# Patient Record
Sex: FEMALE | Race: WHITE | NOT HISPANIC OR LATINO | Employment: STUDENT | URBAN - METROPOLITAN AREA
[De-identification: names, ages, dates, MRNs, and addresses within clinical notes are randomized per-mention and may not be internally consistent; named-entity substitution may affect disease eponyms.]

---

## 2023-03-02 LAB — URINE CULTURE: NORMAL

## 2023-04-12 LAB — URINE CULTURE: NORMAL

## 2023-04-27 LAB — GROUP B STREP SCREEN: ABNORMAL

## 2023-05-12 ENCOUNTER — APPOINTMENT (OUTPATIENT)
Dept: PRIMARY CARE | Facility: CLINIC | Age: 23
End: 2023-05-12
Payer: COMMERCIAL

## 2023-09-26 ENCOUNTER — APPOINTMENT (OUTPATIENT)
Dept: PRIMARY CARE | Facility: CLINIC | Age: 23
End: 2023-09-26
Payer: COMMERCIAL

## 2023-10-27 DIAGNOSIS — F41.9 ANXIETY: Primary | ICD-10-CM

## 2023-10-27 RX ORDER — FLUOXETINE 20 MG/1
20 TABLET ORAL DAILY
Qty: 30 TABLET | Refills: 3 | Status: SHIPPED | OUTPATIENT
Start: 2023-10-27 | End: 2024-01-30

## 2023-11-22 ENCOUNTER — PATIENT MESSAGE (OUTPATIENT)
Dept: OBSTETRICS AND GYNECOLOGY | Facility: CLINIC | Age: 23
End: 2023-11-22
Payer: COMMERCIAL

## 2023-11-22 DIAGNOSIS — F41.9 ANXIETY: Primary | ICD-10-CM

## 2023-11-28 PROBLEM — O24.414 INSULIN CONTROLLED GESTATIONAL DIABETES MELLITUS (GDM) DURING PREGNANCY, ANTEPARTUM (HHS-HCC): Status: RESOLVED | Noted: 2023-11-28 | Resolved: 2023-11-28

## 2023-11-28 PROBLEM — O10.011: Status: RESOLVED | Noted: 2023-11-28 | Resolved: 2023-11-28

## 2023-11-28 PROBLEM — O99.810 GLUCOSE INTOLERANCE OF PREGNANCY (HHS-HCC): Status: RESOLVED | Noted: 2023-11-28 | Resolved: 2023-11-28

## 2023-11-28 PROBLEM — L50.8 CHRONIC URTICARIA: Status: RESOLVED | Noted: 2023-11-28 | Resolved: 2023-11-28

## 2023-11-28 PROBLEM — L44.2 LICHEN STRIATUS: Status: RESOLVED | Noted: 2019-10-25 | Resolved: 2023-11-28

## 2023-11-28 PROBLEM — F41.9 ANXIETY AND DEPRESSION: Status: RESOLVED | Noted: 2023-11-28 | Resolved: 2023-11-28

## 2023-11-28 PROBLEM — K59.09 CHRONIC CONSTIPATION: Status: RESOLVED | Noted: 2023-11-28 | Resolved: 2023-11-28

## 2023-11-28 PROBLEM — F32.A ANXIETY AND DEPRESSION: Status: RESOLVED | Noted: 2023-11-28 | Resolved: 2023-11-28

## 2023-11-28 PROBLEM — E66.3 OVERWEIGHT WITH BODY MASS INDEX (BMI) 25.0-29.9: Status: ACTIVE | Noted: 2023-11-28

## 2023-11-28 PROBLEM — K21.9 GERD (GASTROESOPHAGEAL REFLUX DISEASE): Status: RESOLVED | Noted: 2023-11-28 | Resolved: 2023-11-28

## 2023-11-28 PROBLEM — U07.1 COVID-19: Status: RESOLVED | Noted: 2023-11-28 | Resolved: 2023-11-28

## 2023-11-28 PROBLEM — L70.0 ACNE VULGARIS: Status: RESOLVED | Noted: 2019-10-25 | Resolved: 2023-11-28

## 2023-11-28 PROBLEM — O24.419 GESTATIONAL DIABETES MELLITUS (GDM) IN THIRD TRIMESTER (HHS-HCC): Status: RESOLVED | Noted: 2023-11-28 | Resolved: 2023-11-28

## 2023-11-28 PROBLEM — R73.9 HYPERGLYCEMIA: Status: ACTIVE | Noted: 2023-11-28

## 2023-11-28 PROBLEM — J30.89 PERENNIAL ALLERGIC RHINITIS: Status: RESOLVED | Noted: 2023-11-28 | Resolved: 2023-11-28

## 2023-11-28 PROBLEM — G43.019 INTRACTABLE MIGRAINE WITHOUT AURA AND WITHOUT STATUS MIGRAINOSUS: Status: RESOLVED | Noted: 2023-11-28 | Resolved: 2023-11-28

## 2023-11-28 PROBLEM — E78.00 HYPERCHOLESTEROLEMIA: Status: ACTIVE | Noted: 2023-11-28

## 2023-11-28 PROBLEM — O13.9 GESTATIONAL HYPERTENSION (HHS-HCC): Status: RESOLVED | Noted: 2023-11-28 | Resolved: 2023-11-28

## 2023-11-28 RX ORDER — NORETHINDRONE 0.35 MG/1
1 TABLET ORAL DAILY
COMMUNITY
End: 2024-06-07 | Stop reason: SDUPTHER

## 2023-11-28 RX ORDER — FLUOXETINE HYDROCHLORIDE 40 MG/1
40 CAPSULE ORAL DAILY
Qty: 30 CAPSULE | Refills: 11 | Status: SHIPPED | OUTPATIENT
Start: 2023-11-28 | End: 2024-11-27

## 2024-01-18 ENCOUNTER — TELEPHONE (OUTPATIENT)
Dept: PRIMARY CARE | Facility: CLINIC | Age: 24
End: 2024-01-18
Payer: COMMERCIAL

## 2024-01-30 ENCOUNTER — OFFICE VISIT (OUTPATIENT)
Dept: PRIMARY CARE | Facility: CLINIC | Age: 24
End: 2024-01-30
Payer: COMMERCIAL

## 2024-01-30 VITALS
HEART RATE: 81 BPM | RESPIRATION RATE: 16 BRPM | TEMPERATURE: 97.6 F | DIASTOLIC BLOOD PRESSURE: 76 MMHG | HEIGHT: 60 IN | WEIGHT: 169.4 LBS | OXYGEN SATURATION: 98 % | BODY MASS INDEX: 33.26 KG/M2 | SYSTOLIC BLOOD PRESSURE: 116 MMHG

## 2024-01-30 DIAGNOSIS — Z00.00 WELL ADULT EXAM: Primary | ICD-10-CM

## 2024-01-30 DIAGNOSIS — Z78.9 NEVER SMOKED CIGARETTES: ICD-10-CM

## 2024-01-30 DIAGNOSIS — R73.9 HYPERGLYCEMIA: ICD-10-CM

## 2024-01-30 DIAGNOSIS — E66.9 CLASS 1 OBESITY WITH BODY MASS INDEX (BMI) OF 33.0 TO 33.9 IN ADULT, UNSPECIFIED OBESITY TYPE, UNSPECIFIED WHETHER SERIOUS COMORBIDITY PRESENT: ICD-10-CM

## 2024-01-30 LAB
ALBUMIN SERPL BCP-MCNC: 4.9 G/DL (ref 3.4–5)
ALP SERPL-CCNC: 97 U/L (ref 33–110)
ALT SERPL W P-5'-P-CCNC: 18 U/L (ref 7–45)
ANION GAP SERPL CALC-SCNC: 14 MMOL/L (ref 10–20)
AST SERPL W P-5'-P-CCNC: 15 U/L (ref 9–39)
BILIRUB SERPL-MCNC: 0.3 MG/DL (ref 0–1.2)
BUN SERPL-MCNC: 9 MG/DL (ref 6–23)
CALCIUM SERPL-MCNC: 9.4 MG/DL (ref 8.6–10.3)
CHLORIDE SERPL-SCNC: 104 MMOL/L (ref 98–107)
CHOLEST SERPL-MCNC: 226 MG/DL (ref 0–199)
CHOLESTEROL/HDL RATIO: 4.4
CO2 SERPL-SCNC: 26 MMOL/L (ref 21–32)
CREAT SERPL-MCNC: 0.61 MG/DL (ref 0.5–1.05)
EGFRCR SERPLBLD CKD-EPI 2021: >90 ML/MIN/1.73M*2
ERYTHROCYTE [DISTWIDTH] IN BLOOD BY AUTOMATED COUNT: 12.9 % (ref 11.5–14.5)
GLUCOSE SERPL-MCNC: 69 MG/DL (ref 74–99)
HCT VFR BLD AUTO: 41 % (ref 36–46)
HDLC SERPL-MCNC: 51.6 MG/DL
HGB BLD-MCNC: 13 G/DL (ref 12–16)
LDLC SERPL CALC-MCNC: 146 MG/DL
MCH RBC QN AUTO: 26.4 PG (ref 26–34)
MCHC RBC AUTO-ENTMCNC: 31.7 G/DL (ref 32–36)
MCV RBC AUTO: 83 FL (ref 80–100)
NON HDL CHOLESTEROL: 174 MG/DL (ref 0–149)
NRBC BLD-RTO: 0 /100 WBCS (ref 0–0)
PLATELET # BLD AUTO: 370 X10*3/UL (ref 150–450)
POTASSIUM SERPL-SCNC: 4.4 MMOL/L (ref 3.5–5.3)
PROT SERPL-MCNC: 7.2 G/DL (ref 6.4–8.2)
RBC # BLD AUTO: 4.92 X10*6/UL (ref 4–5.2)
SODIUM SERPL-SCNC: 140 MMOL/L (ref 136–145)
TRIGL SERPL-MCNC: 142 MG/DL (ref 0–149)
VLDL: 28 MG/DL (ref 0–40)
WBC # BLD AUTO: 8.6 X10*3/UL (ref 4.4–11.3)

## 2024-01-30 PROCEDURE — 80061 LIPID PANEL: CPT

## 2024-01-30 PROCEDURE — 1036F TOBACCO NON-USER: CPT | Performed by: FAMILY MEDICINE

## 2024-01-30 PROCEDURE — 83036 HEMOGLOBIN GLYCOSYLATED A1C: CPT

## 2024-01-30 PROCEDURE — 85027 COMPLETE CBC AUTOMATED: CPT

## 2024-01-30 PROCEDURE — 36415 COLL VENOUS BLD VENIPUNCTURE: CPT

## 2024-01-30 PROCEDURE — 80053 COMPREHEN METABOLIC PANEL: CPT

## 2024-01-30 PROCEDURE — 3008F BODY MASS INDEX DOCD: CPT | Performed by: FAMILY MEDICINE

## 2024-01-30 PROCEDURE — 99395 PREV VISIT EST AGE 18-39: CPT | Performed by: FAMILY MEDICINE

## 2024-01-30 ASSESSMENT — ENCOUNTER SYMPTOMS
ABDOMINAL PAIN: 0
HEADACHES: 0
LOSS OF SENSATION IN FEET: 0
EYE PAIN: 0
RHINORRHEA: 0
HALLUCINATIONS: 0
BLOOD IN STOOL: 0
FEVER: 0
COUGH: 0
OCCASIONAL FEELINGS OF UNSTEADINESS: 0
WOUND: 0
DYSURIA: 0
PALPITATIONS: 0
HEMATURIA: 0
CHILLS: 0
EYE REDNESS: 0
SHORTNESS OF BREATH: 0
WEAKNESS: 0
SORE THROAT: 0
BACK PAIN: 0
DEPRESSION: 0
POLYDIPSIA: 0
ADENOPATHY: 0
BRUISES/BLEEDS EASILY: 0
NECK STIFFNESS: 0
FATIGUE: 0

## 2024-01-30 ASSESSMENT — PATIENT HEALTH QUESTIONNAIRE - PHQ9
2. FEELING DOWN, DEPRESSED OR HOPELESS: NOT AT ALL
1. LITTLE INTEREST OR PLEASURE IN DOING THINGS: NOT AT ALL
SUM OF ALL RESPONSES TO PHQ9 QUESTIONS 1 AND 2: 0

## 2024-01-30 NOTE — PROGRESS NOTES
"Emperatriz Duvall \"Leticia\" is a 23 y.o. female with Chief Complaint of Annual Exam (CPE).    OCP Micronor while breastfeeding.   Moods stable on fluoxetine 40 mg.     Changed position working from home which is working well.   Exhausted from up all night.     History reviewed. No pertinent surgical history.   Social History     Socioeconomic History    Marital status: Significant Other     Spouse name: Not on file    Number of children: Not on file    Years of education: Not on file    Highest education level: Not on file   Occupational History    Not on file   Tobacco Use    Smoking status: Never     Passive exposure: Never    Smokeless tobacco: Never   Vaping Use    Vaping Use: Never used   Substance and Sexual Activity    Alcohol use: Not Currently    Drug use: Never    Sexual activity: Yes     Partners: Male     Birth control/protection: OCP   Other Topics Concern    Not on file   Social History Narrative    Not on file     Social Determinants of Health     Financial Resource Strain: Not on file   Food Insecurity: Not on file   Transportation Needs: Not on file   Physical Activity: Not on file   Stress: Not on file   Social Connections: Not on file   Intimate Partner Violence: Not on file   Housing Stability: Not on file     Past Medical History:   Diagnosis Date    Acne vulgaris 10/25/2019    Acute pharyngitis, unspecified 03/05/2015    Sore throat    Adverse effect of other vaccines and biological substances, sequela 08/10/2018    Adverse effect of vaccine, sequela    Allergic contact dermatitis due to plants, except food 06/29/2016    Contact dermatitis due to poison ivy    Chronic constipation 11/28/2023    Chronic ethmoidal sinusitis 12/18/2017    Chronic posterior ethmoidal sinusitis    COVID-19 11/28/2023    GERD (gastroesophageal reflux disease) 11/28/2023    Gestational diabetes mellitus (GDM) in third trimester 11/28/2023    Gestational hypertension 11/28/2023    Glucose intolerance of pregnancy " 11/28/2023    Insulin controlled gestational diabetes mellitus (GDM) during pregnancy, antepartum 11/28/2023    Intractable migraine without aura and without status migrainosus 11/28/2023    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    Other abnormal glucose 08/07/2018    Elevated glucose level    Perennial allergic rhinitis 11/28/2023    Personal history of diseases of the skin and subcutaneous tissue 12/06/2017    History of dermatitis    Personal history of other diseases of the digestive system 08/04/2017    History of constipation    Personal history of other diseases of the nervous system and sense organs 08/25/2015    History of episcleritis    Personal history of other diseases of the respiratory system 02/02/2015    History of acute sinusitis    Personal history of other diseases of the respiratory system 04/30/2015    History of acute bronchitis    Personal history of other specified conditions 08/09/2018    History of nausea    Personal history of other specified conditions 08/04/2017    History of abdominal pain    Personal history of other specified conditions 09/05/2017    History of right flank pain    Personal history of other specified conditions 08/16/2017    History of headache    Pre-existing essential htn comp pregnancy, first trimester 11/28/2023      No family history on file.     Review of Systems   Constitutional:  Negative for chills, fatigue and fever.   HENT:  Negative for rhinorrhea and sore throat.    Eyes:  Negative for pain and redness.   Respiratory:  Negative for cough and shortness of breath.    Cardiovascular:  Negative for chest pain and palpitations.   Gastrointestinal:  Negative for abdominal pain and blood in stool.   Endocrine: Negative for polydipsia and polyuria.   Genitourinary:  Negative for dysuria and hematuria.   Musculoskeletal:  Negative for back pain and neck stiffness.   Skin:  Negative for rash and wound.   Allergic/Immunologic: Negative for environmental allergies and food  allergies.   Neurological:  Negative for weakness and headaches.   Hematological:  Negative for adenopathy. Does not bruise/bleed easily.   Psychiatric/Behavioral:  Negative for hallucinations and suicidal ideas.       /76 (BP Location: Left arm, Patient Position: Sitting, BP Cuff Size: Adult)   Pulse 81   Temp 36.4 °C (97.6 °F) (Temporal)   Resp 16   Ht 1.524 m (5')   Wt 76.8 kg (169 lb 6.4 oz)   SpO2 98%   BMI 33.08 kg/m²   Physical Exam  Vitals reviewed.   Constitutional:       General: She is not in acute distress.     Appearance: She is not ill-appearing.   HENT:      Head: Normocephalic and atraumatic.      Right Ear: Tympanic membrane normal.      Left Ear: Tympanic membrane normal.      Nose: No congestion or rhinorrhea.      Mouth/Throat:      Pharynx: No oropharyngeal exudate or posterior oropharyngeal erythema.   Eyes:      Extraocular Movements: Extraocular movements intact.      Conjunctiva/sclera: Conjunctivae normal.      Pupils: Pupils are equal, round, and reactive to light.   Cardiovascular:      Rate and Rhythm: Normal rate and regular rhythm.      Heart sounds: No murmur heard.     No friction rub. No gallop.   Pulmonary:      Effort: Pulmonary effort is normal.      Breath sounds: Normal breath sounds. No wheezing, rhonchi or rales.   Abdominal:      General: There is no distension.      Palpations: Abdomen is soft.      Tenderness: There is no abdominal tenderness. There is no guarding or rebound.   Musculoskeletal:         General: No swelling or deformity.      Cervical back: Normal range of motion and neck supple.      Right lower leg: No edema.      Left lower leg: No edema.   Skin:     Capillary Refill: Capillary refill takes less than 2 seconds.      Coloration: Skin is not jaundiced.      Findings: No rash.   Neurological:      General: No focal deficit present.      Mental Status: She is alert.      Motor: No weakness.   Psychiatric:         Mood and Affect: Mood normal.     "     Behavior: Behavior normal.       Lab Results   Component Value Date    WBC 8.6 05/09/2023    HGB 10.7 (L) 05/09/2023    HCT 33.1 (L) 05/09/2023    MCV 85 05/09/2023     05/09/2023     Lab Results   Component Value Date    CHOL 263 (H) 09/28/2021    CHOL 247 (H) 09/28/2020     Lab Results   Component Value Date    HDL 54.3 09/28/2021    HDL 57.8 09/28/2020     No results found for: \"LDLCALC\"  Lab Results   Component Value Date    TRIG 105 09/28/2021    TRIG 98 09/28/2020     No components found for: \"CHOLHDL\"  Lab Results   Component Value Date    HGBA1C 5.1 09/28/2020       Assessment/Plan   Problem List Items Addressed This Visit       Hyperglycemia    Overview     Hx of gestational diabetes.          Current Assessment & Plan     Monitor A1c.          [facility] adult exam - Primary    Overview     [facility] exam performed 1/30/24 (Abebe)         Current Assessment & Plan     1/30/24 Annual preventative exam:  continue OCP Micronor.  Mammograms at age 39.  Colonoscopy at age 44.   Exercise and healthy diet.            Post partum depression    Overview     Controlled with fluoxetine 40 mg daily per Dr Berg.           Other Visit Diagnoses       Class 1 obesity with body mass index (BMI) of 33.0 to 33.9 in adult, unspecified obesity type, unspecified whether serious comorbidity present        Never smoked cigarettes                "

## 2024-01-30 NOTE — ASSESSMENT & PLAN NOTE
1/30/24 Annual preventative exam:  continue OCP Micronor.  Mammograms at age 39.  Colonoscopy at age 44.   Exercise and healthy diet.

## 2024-01-31 PROBLEM — R73.03 PRE-DIABETES: Status: ACTIVE | Noted: 2023-11-28

## 2024-01-31 LAB
EST. AVERAGE GLUCOSE BLD GHB EST-MCNC: 117 MG/DL
HBA1C MFR BLD: 5.7 %

## 2024-02-16 ENCOUNTER — APPOINTMENT (OUTPATIENT)
Dept: PRIMARY CARE | Facility: CLINIC | Age: 24
End: 2024-02-16
Payer: COMMERCIAL

## 2024-04-17 ENCOUNTER — PATIENT MESSAGE (OUTPATIENT)
Dept: PRIMARY CARE | Facility: CLINIC | Age: 24
End: 2024-04-17
Payer: COMMERCIAL

## 2024-04-17 DIAGNOSIS — R10.2 PELVIC PAIN AFFECTING PREGNANCY, ANTEPARTUM (HHS-HCC): ICD-10-CM

## 2024-04-17 DIAGNOSIS — M62.08 DIASTASIS RECTI: ICD-10-CM

## 2024-04-17 DIAGNOSIS — R63.5 WEIGHT GAIN: ICD-10-CM

## 2024-04-17 DIAGNOSIS — O26.899 PELVIC PAIN AFFECTING PREGNANCY, ANTEPARTUM (HHS-HCC): ICD-10-CM

## 2024-04-27 ENCOUNTER — LAB (OUTPATIENT)
Dept: LAB | Facility: LAB | Age: 24
End: 2024-04-27
Payer: COMMERCIAL

## 2024-04-27 DIAGNOSIS — R63.5 WEIGHT GAIN: ICD-10-CM

## 2024-04-27 LAB — TSH SERPL-ACNC: 1.61 MIU/L (ref 0.44–3.98)

## 2024-04-27 PROCEDURE — 36415 COLL VENOUS BLD VENIPUNCTURE: CPT

## 2024-04-27 PROCEDURE — 84443 ASSAY THYROID STIM HORMONE: CPT

## 2024-04-30 ENCOUNTER — LAB REQUISITION (OUTPATIENT)
Dept: LAB | Facility: HOSPITAL | Age: 24
End: 2024-04-30
Payer: COMMERCIAL

## 2024-04-30 DIAGNOSIS — J02.9 ACUTE PHARYNGITIS, UNSPECIFIED: ICD-10-CM

## 2024-04-30 LAB — S PYO DNA THROAT QL NAA+PROBE: NOT DETECTED

## 2024-04-30 PROCEDURE — 87651 STREP A DNA AMP PROBE: CPT

## 2024-06-07 DIAGNOSIS — Z30.41 ENCOUNTER FOR SURVEILLANCE OF CONTRACEPTIVE PILLS: Primary | ICD-10-CM

## 2024-06-07 RX ORDER — NORETHINDRONE 0.35 MG/1
1 TABLET ORAL DAILY
Qty: 84 TABLET | Refills: 0 | Status: SHIPPED | OUTPATIENT
Start: 2024-06-07

## 2024-06-13 ENCOUNTER — APPOINTMENT (OUTPATIENT)
Dept: UROLOGY | Facility: CLINIC | Age: 24
End: 2024-06-13
Payer: COMMERCIAL

## 2024-06-13 DIAGNOSIS — O26.899 PELVIC PAIN AFFECTING PREGNANCY, ANTEPARTUM (HHS-HCC): ICD-10-CM

## 2024-06-13 DIAGNOSIS — R10.2 PELVIC PAIN AFFECTING PREGNANCY, ANTEPARTUM (HHS-HCC): ICD-10-CM

## 2024-06-13 DIAGNOSIS — M62.08 DIASTASIS RECTI: ICD-10-CM

## 2024-06-13 LAB
POC APPEARANCE, URINE: CLEAR
POC BILIRUBIN, URINE: NEGATIVE
POC BLOOD, URINE: NEGATIVE
POC COLOR, URINE: YELLOW
POC GLUCOSE, URINE: NEGATIVE MG/DL
POC KETONES, URINE: NEGATIVE MG/DL
POC LEUKOCYTES, URINE: NEGATIVE
POC NITRITE,URINE: NEGATIVE
POC PH, URINE: 7 PH
POC PROTEIN, URINE: NEGATIVE MG/DL
POC SPECIFIC GRAVITY, URINE: 1.02
POC UROBILINOGEN, URINE: 0.2 EU/DL

## 2024-06-13 PROCEDURE — G2211 COMPLEX E/M VISIT ADD ON: HCPCS | Performed by: STUDENT IN AN ORGANIZED HEALTH CARE EDUCATION/TRAINING PROGRAM

## 2024-06-13 PROCEDURE — 51798 US URINE CAPACITY MEASURE: CPT | Performed by: STUDENT IN AN ORGANIZED HEALTH CARE EDUCATION/TRAINING PROGRAM

## 2024-06-13 PROCEDURE — 3008F BODY MASS INDEX DOCD: CPT | Performed by: STUDENT IN AN ORGANIZED HEALTH CARE EDUCATION/TRAINING PROGRAM

## 2024-06-13 PROCEDURE — 99204 OFFICE O/P NEW MOD 45 MIN: CPT | Performed by: STUDENT IN AN ORGANIZED HEALTH CARE EDUCATION/TRAINING PROGRAM

## 2024-06-13 NOTE — LETTER
June 17, 2024     Jt Lomas MD  [address]  [address]  [facility] [address]    Patient: Leticia Duvall   YOB: 2000   Date of Visit: 6/13/2024       Dear Dr. Jt Lomas MD:    Thank you for referring Leticia Duvall to me for evaluation. Below are my notes for this consultation.  If you have questions, please do not hesitate to call me. I look forward to following your patient along with you.       Sincerely,     Alfonzo Montez MD      CC: No Recipients  ______________________________________________________________________________________    HISTORY OF PRESENT ILLNESS:  Epmeratriz Duvall is a 24 y.o. female who presents today as a new patient for pelvic pain.  She is status post a vaginal birth complicated by third-degree laceration that was repaired.  Since then she is reporting pelvic pain and stress urinary.  Pain is worse during intercourse.  But also she complains of some lower abdominal pain.  She is still planning of having more children. She states her bowel movements are normal. She states she feels like she is healing well after her birthing. She does have pain with intercourse.              Past Medical History  She has a past medical history of Acne vulgaris (10/25/2019), Acute pharyngitis, unspecified (03/05/2015), Adverse effect of other vaccines and biological substances, sequela (08/10/2018), Allergic contact dermatitis due to plants, except food (06/29/2016), Chronic constipation (11/28/2023), Chronic ethmoidal sinusitis (12/18/2017), COVID-19 (11/28/2023), GERD (gastroesophageal reflux disease) (11/28/2023), Gestational diabetes mellitus (GDM) in third trimester ([facility]) (11/28/2023), Gestational hypertension ([facility]) (11/28/2023), Glucose intolerance of pregnancy ([facility]) (11/28/2023), Insulin controlled gestational diabetes mellitus (GDM) during pregnancy, antepartum ([facility]) (11/28/2023), Intractable migraine without aura and without status  migrainosus (11/28/2023), 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), Other abnormal glucose (08/07/2018), Perennial allergic rhinitis (11/28/2023), Personal history of diseases of the skin and subcutaneous tissue (12/06/2017), Personal history of other diseases of the digestive system (08/04/2017), Personal history of other diseases of the nervous system and sense organs (08/25/2015), Personal history of other diseases of the respiratory system (02/02/2015), Personal history of other diseases of the respiratory system (04/30/2015), Personal history of other specified conditions (08/09/2018), Personal history of other specified conditions (08/04/2017), Personal history of other specified conditions (09/05/2017), Personal history of other specified conditions (08/16/2017), and Pre-existing essential htn comp pregnancy, first trimester ([facility]) (11/28/2023).    Surgical History  She has no past surgical history on file.     Social History  She reports that she has never smoked. She has never been exposed to tobacco smoke. She has never used smokeless tobacco. She reports that she does not currently use alcohol. She reports that she does not use drugs.    Family History  No family history on file.     Allergies  Azithromycin and Cephalexin        A comprehensive 10+ review of systems was negative except for: see hpi                    PHYSICAL EXAMINATION:  BP Readings from Last 3 Encounters:   01/30/24 116/76   06/29/23 110/72   05/17/23 132/80      Wt Readings from Last 3 Encounters:   01/30/24 76.8 kg (169 lb 6.4 oz)   06/29/23 70.8 kg (156 lb)   05/17/23 71.2 kg (157 lb)      BMI: Estimated body mass index is 33.08 kg/m² as calculated from the following:    Height as of 1/30/24: 1.524 m (5').    Weight as of 1/30/24: 76.8 kg (169 lb 6.4 oz).  BSA: Estimated body surface area is 1.8 meters squared as calculated from the following:    Height as of 1/30/24: 1.524 m (5').    Weight as of 1/30/24: 76.8 kg (169 lb 6.4 oz).  HEENT:  Normocephalic, atraumatic, PER EOMI, nonicteric, trachea normal, thyroid normal, oropharynx normal.  CARDIAC: regular rate & rhythm, S1 & S2 normal.  No heaves, thrills, gallops or murmurs.  LUNGS: Clear to auscultation, no spinal or CV tenderness.  EXTREMITIES: No evidence of cyanosis, clubbing or edema.      Pelvic:  Genitourinary:  normal external genitalia, Bartholin's glands negative, [place]'s glands negative  Urethra   normal meatus, non-tender, no periurethral mass  Vaginal mucosa  normal  Cervix normal  Uterus  normal size, nontender  Adnexae  negative nontender, no masses  Atrophy positive    CST positive  Pelvic floor muscle contraction  4/5    POP-Q (in supine position):        Aa -3     Ba -3     C -8              gh 3     pb 3     tvl 8              Ap -3     Bp -3     D -8    Rectal: no hemorrhoids, fissures or masses    Pain 8-10/10 pain with palpation     PVR 1 mL     IMPRESSION AND PLAN:        Assessment:  23 yo female who presents as a new patient with pelvic pain     Pelvic pain:  Pelvic floor physical therapy referral   Rx vaginal valium       Follow up in 3 months     All questions and concerns were answered and addressed.  The patient expressed understanding and agrees with the plan.     Alfonzo Montez MD    Scribe Attestation  By signing my name below, IRoslyn, [place]rib[place]   attest that this documentation has been prepared under the direction and in the presence of Alfonzo Montez MD.

## 2024-06-13 NOTE — PROGRESS NOTES
HISTORY OF PRESENT ILLNESS:  Emperatriz Duvall is a 24 y.o. female who presents today as a new patient for pelvic pain.  She is status post a vaginal birth complicated by third-degree laceration that was repaired.  Since then she is reporting pelvic pain and stress urinary.  Pain is worse during intercourse.  But also she complains of some lower abdominal pain.  She is still planning of having more children. She states her bowel movements are normal. She states she feels like she is healing well after her birthing. She does have pain with intercourse.              Past Medical History  She has a past medical history of Acne vulgaris (10/25/2019), Acute pharyngitis, unspecified (03/05/2015), Adverse effect of other vaccines and biological substances, sequela (08/10/2018), Allergic contact dermatitis due to plants, except food (06/29/2016), Chronic constipation (11/28/2023), Chronic ethmoidal sinusitis (12/18/2017), COVID-19 (11/28/2023), GERD (gastroesophageal reflux disease) (11/28/2023), Gestational diabetes mellitus (GDM) in third trimester ([facility]) (11/28/2023), Gestational hypertension ([facility]) (11/28/2023), Glucose intolerance of pregnancy ([facility]) (11/28/2023), Insulin controlled gestational diabetes mellitus (GDM) during pregnancy, antepartum ([facility]) (11/28/2023), Intractable migraine without aura and without status migrainosus (11/28/2023), 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), Other abnormal glucose (08/07/2018), Perennial allergic rhinitis (11/28/2023), Personal history of diseases of the skin and subcutaneous tissue (12/06/2017), Personal history of other diseases of the digestive system (08/04/2017), Personal history of other diseases of the nervous system and sense organs (08/25/2015), Personal history of other diseases of the respiratory system (02/02/2015), Personal history of other diseases of the respiratory system (04/30/2015), Personal history of other specified conditions (08/09/2018), Personal  history of other specified conditions (08/04/2017), Personal history of other specified conditions (09/05/2017), Personal history of other specified conditions (08/16/2017), and Pre-existing essential htn comp pregnancy, first trimester ([facility]) (11/28/2023).    Surgical History  She has no past surgical history on file.     Social History  She reports that she has never smoked. She has never been exposed to tobacco smoke. She has never used smokeless tobacco. She reports that she does not currently use alcohol. She reports that she does not use drugs.    Family History  No family history on file.     Allergies  Azithromycin and Cephalexin        A comprehensive 10+ review of systems was negative except for: see hpi                    PHYSICAL EXAMINATION:  BP Readings from Last 3 Encounters:   01/30/24 116/76   06/29/23 110/72   05/17/23 132/80      Wt Readings from Last 3 Encounters:   01/30/24 76.8 kg (169 lb 6.4 oz)   06/29/23 70.8 kg (156 lb)   05/17/23 71.2 kg (157 lb)      BMI: Estimated body mass index is 33.08 kg/m² as calculated from the following:    Height as of 1/30/24: 1.524 m (5').    Weight as of 1/30/24: 76.8 kg (169 lb 6.4 oz).  BSA: Estimated body surface area is 1.8 meters squared as calculated from the following:    Height as of 1/30/24: 1.524 m (5').    Weight as of 1/30/24: 76.8 kg (169 lb 6.4 oz).  HEENT: Normocephalic, atraumatic, PER EOMI, nonicteric, trachea normal, thyroid normal, oropharynx normal.  CARDIAC: regular rate & rhythm, S1 & S2 normal.  No heaves, thrills, gallops or murmurs.  LUNGS: Clear to auscultation, no spinal or CV tenderness.  EXTREMITIES: No evidence of cyanosis, clubbing or edema.      Pelvic:  Genitourinary:  normal external genitalia, Bartholin's glands negative, [place]'s glands negative  Urethra   normal meatus, non-tender, no periurethral mass  Vaginal mucosa  normal  Cervix normal  Uterus  normal size, nontender  Adnexae  negative nontender, no masses  Atrophy  positive    CST positive  Pelvic floor muscle contraction  4/5    POP-Q (in supine position):        Aa -3     Ba -3     C -8              gh 3     pb 3     tvl 8              Ap -3     Bp -3     D -8    Rectal: no hemorrhoids, fissures or masses    Pain 8-10/10 pain with palpation     PVR 1 mL     IMPRESSION AND PLAN:        Assessment:  25 yo female who presents as a new patient with pelvic pain     Pelvic pain:  Pelvic floor physical therapy referral   Rx vaginal valium       Follow up in 3 months     All questions and concerns were answered and addressed.  The patient expressed understanding and agrees with the plan.     Alfonzo Montez MD    Scribe Attestation  By signing my name below, I, Roslyn Banerjee, [place]   attest that this documentation has been prepared under the direction and in the presence of Alfonzo Montez MD.

## 2024-07-17 ENCOUNTER — EVALUATION (OUTPATIENT)
Dept: PHYSICAL THERAPY | Facility: CLINIC | Age: 24
End: 2024-07-17
Payer: COMMERCIAL

## 2024-07-17 DIAGNOSIS — O26.899 PELVIC PAIN AFFECTING PREGNANCY, ANTEPARTUM (HHS-HCC): ICD-10-CM

## 2024-07-17 DIAGNOSIS — R10.2 PELVIC PAIN AFFECTING PREGNANCY, ANTEPARTUM (HHS-HCC): ICD-10-CM

## 2024-07-17 PROCEDURE — 97162 PT EVAL MOD COMPLEX 30 MIN: CPT | Mod: GP

## 2024-07-17 NOTE — PROGRESS NOTES
"Physical Therapy Pelvic Floor  EVALUATION AND TREATMENT    Name: Emperatriz Duvall \"Leticia\"  MRN: [id number]  : 2000  Today's Date: 24     Time Calculation  Start Time: 1105  Stop Time: 1205  Time Calculation (min): 60 min     Time Calculation  Start Time: 1105  Stop Time: 1205  Time Calculation (min): 60 min  PT Evaluation Time Entry  PT Evaluation (Moderate) Time Entry: 30  PT Therapeutic Procedures Time Entry  Therapeutic Exercise Time Entry: 15  Therapeutic Activity Time Entry: 15    Insurance:  Insurance Type: ANTH SHARRI  Visit number: Evaluation   Approved # of visits: EVAL ONLY  Authorization Needed: yes through [employer]  Coverage: 20V - 0 used    Current Problem:  1. Pelvic pain affecting pregnancy, antepartum ([facility])  Referral to Physical Therapy          Subjective     Precautions  PMH: recent pregnancy   Fall Risk: no    Pelvic History:   Chief Complaint/Description of Symptoms:   Pt states has DR - doming in belly, intercourse in painful, cough/sneeze leakage  Urge once starting working out with pilates    May 10th 2023 - Tobias     Exercises: 360 breathing, heel slides, bridge, and happy baby    Not taking valium. Currently breastfeeding    HPI: per medical chart (urology)  24 y.o. female who presents today as a new patient for pelvic pain.  She is status post a vaginal birth complicated by third-degree laceration that was repaired.  Since then she is reporting pelvic pain and stress urinary.  Pain is worse during intercourse.  But also she complains of some lower abdominal pain.  She is still planning of having more children. She states her bowel movements are normal. She states she feels like she is healing well after her birthing. She does have pain with intercourse.      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)    Goals for therapy: \"heal abdominal muscles\"    Objective     Pain - no pain currently       PELVIC PAIN:  Pain " when emptying bladder: No  Pain when urine or clothing touches the skin over the vaginal area or wiping: No  Pain with menses: No (increase in cramping)  Pain with intercourse: Yes, deep, after, superficial, vaginal (improving; spotting)  Instructed and performing Kegel exercises: No  History of back pain: yes, thoracic - increase in mid/back pain (seeing plastic surgeon for breast reduction)  Increase in back pain with pregnancy; R LE sciatica     EXERCISE:  Do you perform Kegels?   Current exercise regime: walk every day about 1.5, Pilate's 1-2x a week    MENSTRUAL HISTORY:  Last menstrual period 24    OB HISTORY:   - vaginal delivery with 3rd degree tear      BLADDER:  Excessive urinary urgency: occasionally  Daytime voiding frequency : 4-5  Nighttime voiding frequency: 0, 1 time  Unintentional urine loss: less than 1x/week  Leakage occurs with: sneeze, cough  Leakage amount: small  Protection: none  Daily fluid intake: 40-50oz  Daily caffeine intake: cup of coffee in the morning  Difficulty initiating urine flow: frequently  Slow/Weak urine stream: frequently  Able to void completely: No  Frequent UTI's: No     BOWEL:  Unintentional stool loss: never  Bowel movement frequency: once a day  Frequent diarrhea: No  Constipation/straining,incomplete bowel movement: No  Taking stool softeners or fiber supplements: No     OBJECTIVE  External and internal assessment explained. Verbal consent obtained to proceed with vaginal exam.    R SLS 8 sec  L SLS 10 sec     Pt demo proper mechanics with squat    Lumbar ROM:   Lumbar flex: WFL     Lumbar ext:  25% imp with discomfort  Lumbar R/L side bend: 25% imp with tightness on left side     MMT   R  L  Hip    R   L    Flexion  4-/5  4/5  IR  3+/5  3+/5 in seated  ER  3+/5  3+/5 in seated  ABD  4-/5  4-/5  ADD  4-/5  4-/5  Ext  4-/5  4-/5  Glute  4-/5  4-/5    Knee   R   L    Ext   4-/5  4-/5  Flexion   4-/5  4-/5    ROM Hip  WLF    Tests  R  L   FADIR  -  -  LORNE  -  -  90/90  -  -  SLR  -  -  Slump  -  -  SI Compression +  +  SI Distraction +  +  Sacral Thrust +  +    External Palpation:  L/R lumbar hypomobility: thoracolumbar hypomobility with mod TTP   Iliopsoas: TTP   Adductor: TTP   Glutes: TTP   Abdominals: TTP      Hamstring dominant    Oblique dominant strategy    PELVIC FLOOR  Patient Position: hookly    Vaginal Observation:  Lift - glute activation  Bear down - min movement  Cough - no movement    Vaginal palpation external:   Pt with min TTP to external introitus     Vaginal palpation internal: sig TTP  4 o'clock (pubococcygeus)   5 o'clock (iliococcygeus)   6 o'clock (coccyx)    7 o'clock (iliococcygeus)   8 o'clock (pubococcygeus)   12 o'clock (pubic symphysis inferior angle)     Pelvic floor:  Strength: 3/5 (initial contraction 0/5 with glute activation; verbal/tactile cues pt was able to complete)  Coordination: 4 in 10 seconds  Endurance: 4 second hold x 5 reps  Tone - significant hypertonicity throughout superficial and deep PF muscles  Pain - significant pain to gentle palpation   R obterator    Outcome Measure:  NIH-CPSI: 15 points    Treatment: EVAL ONLY   Therapeutic Exercise:  Reviewed and completed HEP     Therapeutic Activity:  Extensive education using 3d model of pelvis to educate patient regarding pelvic floor musculature purpose and structure relating to functional activity, bathroom mechanics, breathing techniques, and correlation with back/hip strength and function    Manual:  Internal assessment     Assessment:      24 year old patient presenting to pelvic floor physical therapy with concern of stress incontinence, pelvic pain, and pain with intercourse that began after birth of daughter a year ago. Key impairments include: LE weakness, poor diaphragm movement/core engagement, impaired balance, ROM deficits, postural deficits, pain, and impaired functional movement. Pelvic floor demo significant  hypertonicity with significant tenderness to palpation, with limited mobility and challenged with lengthening pelvic floor. Moderate complexity due to patient's clinical presentation being evolving with changing characteristics, with comorbidities/complexities to include chronic back pain, recent pregnancy, and anxiety/PPD, all of which may negatively impact rehab tolerance and progression.  Patient would benefit from skilled physical therapy services to address these impairments, restore normal ROM and strength to reduce pain and improving activity participation, and skilled PFPT intervention for manual therapy, exercise prescription/progression, and bladder training.    Today's treatment included initial evaluation, patient education regarding diagnosis, prognosis, contributing factors and comorbidities. Patient educated in POC, HEP, and role of physical therapist. Patient received copy of HEP and reviewed and performed HEP to ensure proper education and technique. Physical therapist educated patient on the importance of exercises and management of symptoms. Pt dieter's good standing of education provided today by asking appropriate questions.     Plan:      Frequency and duration: 1x a week for 10 visits.   Potential to achieve rehab goals is good    Plan of care was developed with input and agreement by the patient.     Planned interventions may include: biofeedback, cryotherapy, education/instruction, electrical stimulation, gait training, home program, hot pack, kinesiotaping, manual therapy, neuromuscular re-education, self-care/home management, therapeutic activities, therapeutic exercises, ultrasound, and electrical stimulation.     Home Exercise Plan (HEP):  Exercises Access Code: [email or web address]  - Supine Diaphragmatic Breathing  - 1 x daily - 7 x weekly - 2 sets - 10 reps  - Supine Posterior Pelvic Tilt  - 1-2 x daily - 7 x weekly - 2 sets - 10 reps  - Supine Lower Trunk Rotation  - 1-2 x daily - 7 x weekly - 2  "sets - 10 reps  - Supine March with Posterior Pelvic Tilt  - 1 x daily - 7 x weekly - 2 sets - 10 reps - 5sec hold  - Hooklying Gluteal Sets  - 1-2 x daily - 7 x weekly - 1 sets - 5 reps - 5sec hold  - Bridge  - 1-2 x daily - 7 x weekly - 2 sets - 10 reps  - Clam with Resistance  - 1-2 x daily - 7 x weekly - 2 sets - 10 reps  - Sidelying Reverse Clamshell with Resistance  - 1-2 x daily - 7 x weekly - 2 sets - 10 reps  - Happy Baby with Pelvic Floor Lengthening  - 7 x weekly  - Diaphragmatic Breathing in Child's Pose with Pelvic Floor Relaxation  - 7 x weekly  - Yoga Squat for Pelvic Floor Relaxation  - 7 x weekly    Education  -The \"knack\"  - Urge suppression techniques  - Perineal self-massage    Careplan Goals:  Problem: PT Problem       Goal: PT Goal         Active       PT Problem       PT Goal       Start:  07/17/24    Expected End:  10/15/24       1. Patient will demonstrate independence and report compliance with HEP to facilitate independent rehab program upon discharge.  2. Patient will demonstrate normal pelvic floor tone, and be able to contract/relax pelvic floor to facilitate improved function.  3. Patient will be able to maintain neutral posture with improved trunk strength.  4. Pt will increase muscle strength by >/= 1/2 MMT to provide improved stability and ability to perform activities such as lifting/picking up children, resume full work/household duties without pain, able to perform ADLs/IADLs without pain.  5. Eliminate episodes of incontinence with cough/sneeze to demo improved PF strength and decrease intra-abdominal pressure  6. Pt will improve NIH CPSI by >50% raw score  7. Patient will be able to tolerate internal assessment without pain           "

## 2024-07-19 DIAGNOSIS — R10.2 PELVIC PAIN: ICD-10-CM

## 2024-07-19 DIAGNOSIS — M62.89 PELVIC FLOOR DYSFUNCTION: Primary | ICD-10-CM

## 2024-07-19 RX ORDER — DIAZEPAM 5 MG/1
TABLET ORAL
Qty: 30 TABLET | Refills: 0 | Status: SHIPPED | OUTPATIENT
Start: 2024-07-19

## 2024-07-19 RX ORDER — ESTRADIOL 0.1 MG/G
CREAM VAGINAL
Qty: 42.5 G | Refills: 12 | Status: SHIPPED | OUTPATIENT
Start: 2024-07-19

## 2024-07-19 NOTE — PROGRESS NOTES
Patient would like to try valium and estrogen for pelvic pain.    Will do one time rx of valium. I have personally reviewed the OARRS report for this patient. I have considered the risks of abuse, dependence, addiction and diversion. I believe that it is clinically appropriate for this patient_ to be prescribed this medication.

## 2024-08-14 ENCOUNTER — TREATMENT (OUTPATIENT)
Dept: PHYSICAL THERAPY | Facility: CLINIC | Age: 24
End: 2024-08-14
Payer: COMMERCIAL

## 2024-08-14 DIAGNOSIS — R10.2 PELVIC PAIN AFFECTING PREGNANCY, ANTEPARTUM (HHS-HCC): ICD-10-CM

## 2024-08-14 DIAGNOSIS — O26.899 PELVIC PAIN AFFECTING PREGNANCY, ANTEPARTUM (HHS-HCC): ICD-10-CM

## 2024-08-14 PROCEDURE — 97110 THERAPEUTIC EXERCISES: CPT | Mod: GP

## 2024-08-14 PROCEDURE — 97530 THERAPEUTIC ACTIVITIES: CPT | Mod: GP

## 2024-08-14 NOTE — PROGRESS NOTES
Physical Therapy Treatment    Patient Name: Emperatriz Duvall  MRN: [id number]  PT Received on: 2024    Time Calculation  Start Time: 0300  Stop Time: 355  Time Calculation (min): 55 min  PT Therapeutic Procedures Time Entry  Therapeutic Exercise Time Entry: 40  Therapeutic Activity Time Entry: 15    Visit # 2 of 5 (eval + 4 visits)  Visits/Dates Authorized: 4 visits  to    [id number]   ESTIM NOT A COVERED BENEFIT   **Please make sure to do a significant recheck on last visit () as it will likely go to peer to peer.     Current Problem:   Problem List Items Addressed This Visit             ICD-10-CM    Pelvic pain affecting pregnancy, antepartum ([facility] O26.899, R10.2     Precautions:  - vaginal delivery with 3rd degree tear  (5/10/23)       Subjective   General:   Pt states doing exercises; seems to be helping.   Breathing out during lifting/standing which is helping.   Only leaked 1x with big sneeze.   Intermittent breastfeeding; a few minutes a day.   Seeing urologist next month.   Tried perineal massage once - a lot of pain. Has estrogen cream.   Attempted intercourse; discomfort with initial penetration./     Upper back pain still significant; potential for breast reduction after having children.      Pre-Treatment Symptoms:   Upper back bothersome/mild lower abdominal discomfort        Objective   Findings:   Pelvic floor:  Strength: 3/5 (initial contraction 0/5 with glute activation; verbal/tactile cues pt was able to complete)  Coordination: 4 in 10 seconds  Endurance: 4 second hold x 5 reps  Tone - significant hypertonicity throughout superficial and deep PF muscles  Pain - significant pain to gentle palpation              R obterator     Outcome Measure:  NIH-CPSI: 15 points        Treatments:  Therapeutic Exercise:   Reviewed and updated HEP    Neuromuscular Re-Ed: DNP    Therapeutic Activity:  Reviewed bathroom mechanics, breathing techniques, and perineal massage.      Manual Therapy: DNP      Assessment:   Pt demo improved TA engagement and breath work. Progressed pt with exercises. Updated HEP. Improved thoracic mobility and posture with exercises. Progress note next visit. Discussed using estrogen cream and attempting perineal massage again.      Post-Treatment Symptoms:   Muscle fatigue/improved thoracic mobility     Plan: continue to work on TA engagement, breathing mechanics, and PF lengthening and decrease MM tension.     Home Exercise Plan (HEP):  Exercises Access Code: [email or web address]  - Supine Diaphragmatic Breathing with Pelvic Floor Lengthening  - 1 x daily - 7 x weekly - 2 sets - 10 reps  - Supine Posterior Pelvic Tilt  - 1-2 x daily - 7 x weekly - 2 sets - 10 reps  - Supine Lower Trunk Rotation  - 1-2 x daily - 7 x weekly - 2 sets - 10 reps  - Supine March with Posterior Pelvic Tilt  - 1 x daily - 3-4 x weekly - 2 sets - 10 reps - 5sec hold  - Supine Transversus Abdominis Bracing with Leg Extension  - 1 x daily - 3-4 x weekly - 2 sets - 10 reps  - Hooklying Gluteal Sets  - 1-2 x daily - 3-4 x weekly - 1 sets - 5 reps - 5sec hold  - Bridge  - 1-2 x daily - 3-4 x weekly - 2 sets - 10 reps  - Clam with Resistance  - 1-2 x daily - 3-4 x weekly - 2 sets - 10 reps  - Sidelying Reverse Clamshell with Resistance  - 1-2 x daily - 3-4 x weekly - 2 sets - 10 reps  - Clamshell in Abduction  - 1 x daily - 3-4 x weekly - 2 sets - 10 reps  - Reverse Clamshell in Extension and Abduction  - 1 x daily - 3-4 x weekly - 2 sets - 10 reps  - Sidelying Open Book Thoracic Lumbar Rotation and Extension  - 1 x daily - 7 x weekly - 2 sets - 10 reps  - Happy Baby with Pelvic Floor Lengthening  - 7 x weekly  - Diaphragmatic Breathing in Child's Pose with Pelvic Floor Relaxation  - 7 x weekly  - Yoga Squat for Pelvic Floor Relaxation  - 7 x weekly  - Cat Cow  - 1 x daily - 7 x weekly - 2 sets - 10 reps  - Cat Cow to Child's Pose  - 1 x daily - 7 x weekly - 2 sets - 10 reps  - Quadruped Full Range  "Thoracic Rotation with Reach  - 1 x daily - 7 x weekly - 2 sets - 10 reps  - Wall [place]  - 1 x daily - 7 x weekly - 2 sets - 10 reps  - Standing Shoulder Horizontal Abduction with Resistance  - 1 x daily - 3-4 x weekly - 2 sets - 10 reps  - Standing Shoulder External Rotation with Resistance  - 1 x daily - 3-4 x weekly - 2 sets - 10 reps  - Standing Shoulder Single Arm PNF D2 Flexion with Resistance  - 1 x daily - 3-4 x weekly - 2 sets - 10 reps     Education  -The \"knack\"  - Urge suppression techniques  - Perineal self-massage    Goals:   Active       PT Problem       PT Goal       Start:  07/17/24    Expected End:  10/15/24       1. Patient will demonstrate independence and report compliance with HEP to facilitate independent rehab program upon discharge.  2. Patient will demonstrate normal pelvic floor tone, and be able to contract/relax pelvic floor to facilitate improved function.  3. Patient will be able to maintain neutral posture with improved trunk strength.  4. Pt will increase muscle strength by >/= 1/2 MMT to provide improved stability and ability to perform activities such as lifting/picking up children, resume full work/household duties without pain, able to perform ADLs/IADLs without pain.  5. Eliminate episodes of incontinence with cough/sneeze to demo improved PF strength and decrease intra-abdominal pressure  6. Pt will improve NIH CPSI by >50% raw score  7. Patient will be able to tolerate internal assessment without pain           "

## 2024-08-15 NOTE — PROGRESS NOTES
Physical Therapy Treatment  &  PROGRESS NOTE    Patient Name: Emperatriz Duvall  MRN: [id number]  PT Received on: 2024         Visit # Visit count could not be calculated. Make sure you are using a visit which is associated with an episode. of 5 (eval + 4 visits)  Visits/Dates Authorized: 4 visits  to    [id number]   ESTIM NOT A COVERED BENEFIT   **Please make sure to do a significant recheck on last visit () as it will likely go to peer to peer.     Current Problem:   Problem List Items Addressed This Visit    None    Precautions:  - vaginal delivery with 3rd degree tear  (5/10/23)       Subjective   General:   Pt states doing exercises; seems to be helping.   Breathing out during lifting/standing which is helping.   Only leaked 1x with big sneeze.   Intermittent breastfeeding; a few minutes a day.   Seeing urologist next month.   Tried perineal massage once - a lot of pain. Has estrogen cream.   Attempted intercourse; discomfort with initial penetration./     Upper back pain still significant; potential for breast reduction after having children.      Pre-Treatment Symptoms:   Upper back bothersome/mild lower abdominal discomfort        Objective   Findings:   Pelvic floor:  Strength: 3/5 (initial contraction 0/5 with glute activation; verbal/tactile cues pt was able to complete)  Coordination: 4 in 10 seconds  Endurance: 4 second hold x 5 reps  Tone - significant hypertonicity throughout superficial and deep PF muscles  Pain - significant pain to gentle palpation              R obterator     Outcome Measure:  NIH-CPSI: 15 points        Treatments:  Therapeutic Exercise:   Reviewed and updated HEP    Neuromuscular Re-Ed: DNP    Therapeutic Activity:  Reviewed bathroom mechanics, breathing techniques, and perineal massage.     Manual Therapy: DNP      Assessment:   Pt demo improved TA engagement and breath work. Progressed pt with exercises. Updated HEP. Improved thoracic  mobility and posture with exercises. Progress note next visit. Discussed using estrogen cream and attempting perineal massage again.      Post-Treatment Symptoms:   Muscle fatigue/improved thoracic mobility     Plan: continue to work on TA engagement, breathing mechanics, and PF lengthening and decrease MM tension.     Home Exercise Plan (HEP):  Exercises Access Code: [email or web address]  - Supine Diaphragmatic Breathing with Pelvic Floor Lengthening  - 1 x daily - 7 x weekly - 2 sets - 10 reps  - Supine Posterior Pelvic Tilt  - 1-2 x daily - 7 x weekly - 2 sets - 10 reps  - Supine Lower Trunk Rotation  - 1-2 x daily - 7 x weekly - 2 sets - 10 reps  - Supine March with Posterior Pelvic Tilt  - 1 x daily - 3-4 x weekly - 2 sets - 10 reps - 5sec hold  - Supine Transversus Abdominis Bracing with Leg Extension  - 1 x daily - 3-4 x weekly - 2 sets - 10 reps  - Hooklying Gluteal Sets  - 1-2 x daily - 3-4 x weekly - 1 sets - 5 reps - 5sec hold  - Bridge  - 1-2 x daily - 3-4 x weekly - 2 sets - 10 reps  - Clam with Resistance  - 1-2 x daily - 3-4 x weekly - 2 sets - 10 reps  - Sidelying Reverse Clamshell with Resistance  - 1-2 x daily - 3-4 x weekly - 2 sets - 10 reps  - Clamshell in Abduction  - 1 x daily - 3-4 x weekly - 2 sets - 10 reps  - Reverse Clamshell in Extension and Abduction  - 1 x daily - 3-4 x weekly - 2 sets - 10 reps  - Sidelying Open Book Thoracic Lumbar Rotation and Extension  - 1 x daily - 7 x weekly - 2 sets - 10 reps  - Happy Baby with Pelvic Floor Lengthening  - 7 x weekly  - Diaphragmatic Breathing in Child's Pose with Pelvic Floor Relaxation  - 7 x weekly  - Yoga Squat for Pelvic Floor Relaxation  - 7 x weekly  - Cat Cow  - 1 x daily - 7 x weekly - 2 sets - 10 reps  - Cat Cow to Child's Pose  - 1 x daily - 7 x weekly - 2 sets - 10 reps  - Quadruped Full Range Thoracic Rotation with Reach  - 1 x daily - 7 x weekly - 2 sets - 10 reps  - Wall [place]  - 1 x daily - 7 x weekly - 2 sets - 10 reps  - Standing Shoulder  "Horizontal Abduction with Resistance  - 1 x daily - 3-4 x weekly - 2 sets - 10 reps  - Standing Shoulder External Rotation with Resistance  - 1 x daily - 3-4 x weekly - 2 sets - 10 reps  - Standing Shoulder Single Arm PNF D2 Flexion with Resistance  - 1 x daily - 3-4 x weekly - 2 sets - 10 reps     Education  -The \"knack\"  - Urge suppression techniques  - Perineal self-massage    Goals:       "

## 2024-08-19 ENCOUNTER — APPOINTMENT (OUTPATIENT)
Dept: PHYSICAL THERAPY | Facility: CLINIC | Age: 24
End: 2024-08-19
Payer: COMMERCIAL

## 2024-09-10 RX ORDER — FLUOXETINE 20 MG/1
20 TABLET ORAL DAILY
Qty: 30 TABLET | Refills: 3 | Status: SHIPPED | OUTPATIENT
Start: 2024-09-10 | End: 2025-01-08

## 2024-09-19 ENCOUNTER — APPOINTMENT (OUTPATIENT)
Dept: UROLOGY | Facility: CLINIC | Age: 24
End: 2024-09-19
Payer: COMMERCIAL

## 2024-10-11 ENCOUNTER — HOSPITAL ENCOUNTER (OUTPATIENT)
Dept: RADIOLOGY | Facility: HOSPITAL | Age: 24
Discharge: HOME | End: 2024-10-11
Payer: COMMERCIAL

## 2024-10-11 DIAGNOSIS — O03.9 SAB (SPONTANEOUS ABORTION) (HHS-HCC): ICD-10-CM

## 2024-10-11 PROCEDURE — 76801 OB US < 14 WKS SINGLE FETUS: CPT

## 2024-10-15 ENCOUNTER — LAB (OUTPATIENT)
Dept: LAB | Facility: LAB | Age: 24
End: 2024-10-15
Payer: COMMERCIAL

## 2024-10-15 DIAGNOSIS — N91.1 SECONDARY AMENORRHEA: Primary | ICD-10-CM

## 2024-10-15 LAB — B-HCG SERPL-ACNC: 909 MIU/ML

## 2024-10-15 PROCEDURE — 36415 COLL VENOUS BLD VENIPUNCTURE: CPT

## 2024-10-15 PROCEDURE — 84702 CHORIONIC GONADOTROPIN TEST: CPT

## 2024-10-22 ENCOUNTER — LAB (OUTPATIENT)
Dept: LAB | Facility: LAB | Age: 24
End: 2024-10-22
Payer: COMMERCIAL

## 2024-10-22 DIAGNOSIS — N91.0 PRIMARY AMENORRHEA: Primary | ICD-10-CM

## 2024-10-22 LAB — B-HCG SERPL-ACNC: 31 MIU/ML

## 2024-10-22 PROCEDURE — 36415 COLL VENOUS BLD VENIPUNCTURE: CPT

## 2024-10-22 PROCEDURE — 84702 CHORIONIC GONADOTROPIN TEST: CPT

## 2024-11-27 ENCOUNTER — DOCUMENTATION (OUTPATIENT)
Dept: PHYSICAL THERAPY | Facility: CLINIC | Age: 24
End: 2024-11-27
Payer: COMMERCIAL

## 2024-11-27 NOTE — PROGRESS NOTES
"Physical Therapy    Discharge Summary    Name: Emperatriz Duvall \"Leticia\"  MRN: [id number]  Date: 11/27/2024    Discharge Information: Date of discharge 11/27/24, Date of last visit 8/14/24, Date of evaluation 7/17/24, Number of attended visits 1    Therapy Summary: Pt attended one visit following initial evaluation. Updated HEP and pt noted improvement with stress incontinence. Pt did not return for follow ups.       Rehab Discharge Reason: Failed to schedule and/or keep follow-up appointment(s)  "

## 2025-01-14 DIAGNOSIS — F41.9 ANXIETY: ICD-10-CM

## 2025-01-14 RX ORDER — FLUOXETINE HYDROCHLORIDE 40 MG/1
40 CAPSULE ORAL DAILY
Qty: 90 CAPSULE | Refills: 3 | Status: SHIPPED | OUTPATIENT
Start: 2025-01-14 | End: 2026-01-14

## 2025-01-14 RX ORDER — FLUOXETINE 20 MG/1
20 TABLET ORAL DAILY
Qty: 30 TABLET | Refills: 3 | Status: CANCELLED | OUTPATIENT
Start: 2025-01-14 | End: 2025-05-14

## 2025-01-31 ENCOUNTER — APPOINTMENT (OUTPATIENT)
Dept: PRIMARY CARE | Facility: CLINIC | Age: 25
End: 2025-01-31
Payer: COMMERCIAL

## 2025-03-04 RX ORDER — FLUTICASONE PROPIONATE 50 MCG
1 SPRAY, SUSPENSION (ML) NASAL DAILY
COMMUNITY
Start: 2024-04-30

## 2025-03-14 ENCOUNTER — APPOINTMENT (OUTPATIENT)
Dept: PRIMARY CARE | Facility: CLINIC | Age: 25
End: 2025-03-14
Payer: COMMERCIAL

## 2025-03-14 VITALS
WEIGHT: 171 LBS | HEART RATE: 91 BPM | DIASTOLIC BLOOD PRESSURE: 86 MMHG | TEMPERATURE: 97.2 F | OXYGEN SATURATION: 97 % | SYSTOLIC BLOOD PRESSURE: 122 MMHG | BODY MASS INDEX: 33.57 KG/M2 | HEIGHT: 60 IN

## 2025-03-14 DIAGNOSIS — O26.899 PELVIC PAIN AFFECTING PREGNANCY, ANTEPARTUM (HHS-HCC): Primary | ICD-10-CM

## 2025-03-14 DIAGNOSIS — Z00.00 WELL ADULT EXAM: ICD-10-CM

## 2025-03-14 DIAGNOSIS — R10.2 PELVIC PAIN AFFECTING PREGNANCY, ANTEPARTUM (HHS-HCC): Primary | ICD-10-CM

## 2025-03-14 DIAGNOSIS — R73.03 PRE-DIABETES: ICD-10-CM

## 2025-03-14 PROBLEM — M54.31 SCIATICA OF RIGHT SIDE: Status: ACTIVE | Noted: 2025-03-14

## 2025-03-14 PROCEDURE — 99395 PREV VISIT EST AGE 18-39: CPT | Performed by: FAMILY MEDICINE

## 2025-03-14 PROCEDURE — 3008F BODY MASS INDEX DOCD: CPT | Performed by: FAMILY MEDICINE

## 2025-03-14 PROCEDURE — 1036F TOBACCO NON-USER: CPT | Performed by: FAMILY MEDICINE

## 2025-03-14 ASSESSMENT — ENCOUNTER SYMPTOMS
SORE THROAT: 0
HEADACHES: 0
FATIGUE: 0
PALPITATIONS: 0
BRUISES/BLEEDS EASILY: 0
SHORTNESS OF BREATH: 0
FEVER: 0
RHINORRHEA: 0
WOUND: 0
CHILLS: 0
BLOOD IN STOOL: 0
ADENOPATHY: 0
WEAKNESS: 0
COUGH: 0
EYE REDNESS: 0
EYE PAIN: 0
DYSURIA: 0
POLYDIPSIA: 0
ABDOMINAL PAIN: 0
BACK PAIN: 0
HALLUCINATIONS: 0
NECK STIFFNESS: 0
HEMATURIA: 0

## 2025-03-14 NOTE — ASSESSMENT & PLAN NOTE
3/14/25 Annual preventative exam:  off OCP Micronor.  Ok with another pregnancy.      Mammograms at age 39.    Colonoscopy at age 44.     Continue Exercise and healthy diet.     No Etoh use.

## 2025-03-14 NOTE — ASSESSMENT & PLAN NOTE
Improved with pelvic floor therapy per Dr Montez.  Never needed to take the valium or estrogen cream.

## 2025-03-14 NOTE — PROGRESS NOTES
"Emperatriz Duvall \"Leticia\" is a 24 y.o. female with Chief Complaint of Annual Exam.    Miscarriage 10/2024 -- managed per [facility] midwife with Dr Christopher.     OCP Micronor while breastfeeding.   Daughter still nursing -- goal to wean by age 1.     Moods stable on fluoxetine 40 mg every other day..  Did not tolerate GDR to 20 mg daily.    Changed position working from home which is working well.   Exhausted from up all night.     No past surgical history on file.   Social History     Socioeconomic History    Marital status: Significant Other     Spouse name: Not on file    Number of children: Not on file    Years of education: Not on file    Highest education level: Not on file   Occupational History    Not on file   Tobacco Use    Smoking status: Never     Passive exposure: Never    Smokeless tobacco: Never   Vaping Use    Vaping status: Never Used   Substance and Sexual Activity    Alcohol use: Not Currently    Drug use: Never    Sexual activity: Yes     Partners: Male     Birth control/protection: OCP   Other Topics Concern    Not on file   Social History Narrative    Not on file     Social Drivers of Health     Financial Resource Strain: Not on file   Food Insecurity: Not on file   Transportation Needs: Not on file   Physical Activity: Not on file   Stress: Not on file   Social Connections: Not on file   Intimate Partner Violence: Not on file   Housing Stability: Not on file     Past Medical History:   Diagnosis Date    Acne vulgaris 10/25/2019    Acute pharyngitis, unspecified 03/05/2015    Sore throat    Adverse effect of other vaccines and biological substances, sequela 08/10/2018    Adverse effect of vaccine, sequela    Allergic contact dermatitis due to plants, except food 06/29/2016    Contact dermatitis due to poison ivy    Chronic constipation 11/28/2023    Chronic ethmoidal sinusitis 12/18/2017    Chronic posterior ethmoidal sinusitis    COVID-19 11/28/2023    GERD (gastroesophageal reflux disease) " 11/28/2023    Gestational diabetes mellitus (GDM) in third trimester ([facility]) 11/28/2023    Gestational hypertension ([facility]) 11/28/2023    Glucose intolerance of pregnancy ([facility]) 11/28/2023    Insulin controlled gestational diabetes mellitus (GDM) during pregnancy, antepartum ([facility]) 11/28/2023    Intractable migraine without aura and without status migrainosus 11/28/2023    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    Other abnormal glucose 08/07/2018    Elevated glucose level    Perennial allergic rhinitis 11/28/2023    Personal history of diseases of the skin and subcutaneous tissue 12/06/2017    History of dermatitis    Personal history of other diseases of the digestive system 08/04/2017    History of constipation    Personal history of other diseases of the nervous system and sense organs 08/25/2015    History of episcleritis    Personal history of other diseases of the respiratory system 02/02/2015    History of acute sinusitis    Personal history of other diseases of the respiratory system 04/30/2015    History of acute bronchitis    Personal history of other specified conditions 08/09/2018    History of nausea    Personal history of other specified conditions 08/04/2017    History of abdominal pain    Personal history of other specified conditions 09/05/2017    History of right flank pain    Personal history of other specified conditions 08/16/2017    History of headache    Pre-existing essential htn comp pregnancy, first trimester ([facility]) 11/28/2023      No family history on file.     Review of Systems   Constitutional:  Negative for chills, fatigue and fever.   HENT:  Negative for rhinorrhea and sore throat.    Eyes:  Negative for pain and redness.   Respiratory:  Negative for cough and shortness of breath.    Cardiovascular:  Negative for chest pain and palpitations.   Gastrointestinal:  Negative for abdominal pain and blood in stool.   Endocrine: Negative for polydipsia and polyuria.   Genitourinary:   Negative for dysuria and hematuria.   Musculoskeletal:  Negative for back pain and neck stiffness.   Skin:  Negative for rash and wound.   Allergic/Immunologic: Negative for environmental allergies and food allergies.   Neurological:  Negative for weakness and headaches.   Hematological:  Negative for adenopathy. Does not bruise/bleed easily.   Psychiatric/Behavioral:  Negative for hallucinations and suicidal ideas.       /86   Pulse 91   Temp 36.2 °C (97.2 °F)   Ht 1.524 m (5')   Wt 77.6 kg (171 lb)   SpO2 97%   BMI 33.40 kg/m²   Physical Exam  Vitals reviewed.   Constitutional:       General: She is not in acute distress.     Appearance: She is not ill-appearing.   HENT:      Head: Normocephalic and atraumatic.      Right Ear: Tympanic membrane normal.      Left Ear: Tympanic membrane normal.      Nose: No congestion or rhinorrhea.      Mouth/Throat:      Pharynx: No oropharyngeal exudate or posterior oropharyngeal erythema.   Eyes:      Extraocular Movements: Extraocular movements intact.      Conjunctiva/sclera: Conjunctivae normal.      Pupils: Pupils are equal, round, and reactive to light.   Cardiovascular:      Rate and Rhythm: Normal rate and regular rhythm.      Heart sounds: No murmur heard.     No friction rub. No gallop.   Pulmonary:      Effort: Pulmonary effort is normal.      Breath sounds: Normal breath sounds. No wheezing, rhonchi or rales.   Abdominal:      General: There is no distension.      Palpations: Abdomen is soft.      Tenderness: There is no abdominal tenderness. There is no guarding or rebound.   Musculoskeletal:         General: No swelling or deformity.      Cervical back: Normal range of motion and neck supple.      Right lower leg: No edema.      Left lower leg: No edema.   Skin:     Capillary Refill: Capillary refill takes less than 2 seconds.      Coloration: Skin is not jaundiced.      Findings: No rash.   Neurological:      General: No focal deficit present.       "Mental Status: She is alert.      Motor: No weakness.   Psychiatric:         Mood and Affect: Mood normal.         Behavior: Behavior normal.       Lab Results   Component Value Date    WBC 8.6 01/30/2024    HGB 13.0 01/30/2024    HCT 41.0 01/30/2024    MCV 83 01/30/2024     01/30/2024     Lab Results   Component Value Date    CHOL 226 (H) 01/30/2024    CHOL 263 (H) 09/28/2021    CHOL 247 (H) 09/28/2020     Lab Results   Component Value Date    HDL 51.6 01/30/2024    HDL 54.3 09/28/2021    HDL 57.8 09/28/2020     Lab Results   Component Value Date    LDLCALC 146 (H) 01/30/2024     Lab Results   Component Value Date    TRIG 142 01/30/2024    TRIG 105 09/28/2021    TRIG 98 09/28/2020     No components found for: \"CHOLHDL\"  Lab Results   Component Value Date    HGBA1C 5.7 (H) 01/30/2024       Assessment/Plan   Problem List Items Addressed This Visit       Pre-diabetes    Overview     Hx of gestational diabetes.   A1c 5.7 (1/30/24)         Well adult exam    Overview     3/14/25 Wellness exam performed ([facility])         Current Assessment & Plan     3/14/25 Annual preventative exam:  off OCP Micronor.  Ok with another pregnancy.      Mammograms at age 39.    Colonoscopy at age 44.     Continue Exercise and healthy diet.     No Etoh use.          Post partum depression    Overview     Even if successfully weans off, will need to restart after delivery of next child.          Current Assessment & Plan     Controlled with fluoxetine 40 mg every other day.           Pelvic pain affecting pregnancy, antepartum ([facility]) - Primary    Current Assessment & Plan     Improved with pelvic floor therapy per Dr Montez.  Never needed to take the valium or estrogen cream.               "

## 2025-03-18 DIAGNOSIS — M54.9 BACK PAIN WITH RADIATION: Primary | ICD-10-CM

## 2025-03-18 RX ORDER — METHOCARBAMOL 500 MG/1
500 TABLET, FILM COATED ORAL 4 TIMES DAILY PRN
Qty: 40 TABLET | Refills: 0 | Status: SHIPPED | OUTPATIENT
Start: 2025-03-18 | End: 2025-05-17

## 2025-04-11 ENCOUNTER — APPOINTMENT (OUTPATIENT)
Facility: CLINIC | Age: 25
End: 2025-04-11
Payer: COMMERCIAL

## 2025-04-11 VITALS
BODY MASS INDEX: 32.2 KG/M2 | WEIGHT: 164 LBS | DIASTOLIC BLOOD PRESSURE: 80 MMHG | SYSTOLIC BLOOD PRESSURE: 126 MMHG | HEIGHT: 60 IN

## 2025-04-11 DIAGNOSIS — Z01.419 WELL WOMAN EXAM WITH ROUTINE GYNECOLOGICAL EXAM: Primary | ICD-10-CM

## 2025-04-11 PROCEDURE — 1036F TOBACCO NON-USER: CPT | Performed by: ADVANCED PRACTICE MIDWIFE

## 2025-04-11 PROCEDURE — 99395 PREV VISIT EST AGE 18-39: CPT | Performed by: ADVANCED PRACTICE MIDWIFE

## 2025-04-11 PROCEDURE — 3008F BODY MASS INDEX DOCD: CPT | Performed by: ADVANCED PRACTICE MIDWIFE

## 2025-04-11 ASSESSMENT — PATIENT HEALTH QUESTIONNAIRE - PHQ9
1. LITTLE INTEREST OR PLEASURE IN DOING THINGS: NOT AT ALL
SUM OF ALL RESPONSES TO PHQ9 QUESTIONS 1 AND 2: 0
2. FEELING DOWN, DEPRESSED OR HOPELESS: NOT AT ALL

## 2025-04-11 ASSESSMENT — ENCOUNTER SYMPTOMS
ABDOMINAL PAIN: 0
SHORTNESS OF BREATH: 0
LIGHT-HEADEDNESS: 0
CONSTIPATION: 0
PALPITATIONS: 0
DIARRHEA: 0
NAUSEA: 0
VOMITING: 0
DIZZINESS: 0
FATIGUE: 0
COUGH: 0
FEVER: 0

## 2025-04-11 ASSESSMENT — COLUMBIA-SUICIDE SEVERITY RATING SCALE - C-SSRS
2. HAVE YOU ACTUALLY HAD ANY THOUGHTS OF KILLING YOURSELF?: NO
1. IN THE PAST MONTH, HAVE YOU WISHED YOU WERE DEAD OR WISHED YOU COULD GO TO SLEEP AND NOT WAKE UP?: NO
6. HAVE YOU EVER DONE ANYTHING, STARTED TO DO ANYTHING, OR PREPARED TO DO ANYTHING TO END YOUR LIFE?: NO

## 2025-04-11 ASSESSMENT — PAIN SCALES - GENERAL: PAINLEVEL_OUTOF10: 0-NO PAIN

## 2025-04-11 NOTE — PROGRESS NOTES
"Subjective   Patient ID: Emperatriz Duvall \"Leticia\" is a 25 y.o. female who presents for Annual Exam (Pt here for annual GYN exam. Denies GYN probs/pain. Sexually active, not using anything for contraception. Cycles regular./Last PAP: 6/2023 Normal).  HPI  24 y/o presents to office for well women exam.   Periods are monthly and regular. Sexually active not using anything for conception. Last pap 2023 normal. A few days late on cycle this month.       Review of Systems   Constitutional:  Negative for fatigue and fever.   Respiratory:  Negative for cough and shortness of breath.    Cardiovascular:  Negative for chest pain and palpitations.   Gastrointestinal:  Negative for abdominal pain, constipation, diarrhea, nausea and vomiting.   Endocrine: Negative for cold intolerance and heat intolerance.   Genitourinary:  Negative for dyspareunia, pelvic pain, vaginal discharge and vaginal pain.   Neurological:  Negative for dizziness and light-headedness.       Objective   Physical Exam  Vitals reviewed.   Constitutional:       Appearance: Normal appearance.   Neck:      Thyroid: No thyroid mass, thyromegaly or thyroid tenderness.   Cardiovascular:      Rate and Rhythm: Normal rate and regular rhythm.      Heart sounds: Normal heart sounds.   Pulmonary:      Effort: Pulmonary effort is normal.      Breath sounds: Normal breath sounds.   Chest:   Breasts:     Right: Normal. No mass.      Left: Normal. No mass.   Abdominal:      General: Bowel sounds are normal.      Palpations: Abdomen is soft.      Tenderness: There is no abdominal tenderness.   Genitourinary:     General: Normal vulva.      Vagina: Normal.      Cervix: Normal.      Uterus: Normal.       Adnexa: Right adnexa normal.   Musculoskeletal:         General: Normal range of motion.      Cervical back: No tenderness.   Skin:     General: Skin is warm.   Neurological:      General: No focal deficit present.      Mental Status: She is alert and oriented to person, " place, and time.   Psychiatric:         Attention and Perception: Attention normal.         Behavior: Behavior normal.         Assessment/Plan   Diagnoses and all orders for this visit:  Well woman exam with routine gynecological exam  - Reviewed healthy eating habits and exercise. Recommended 30 min a day at least 3 days a week including weight bearing exercise.   - Recommended self breast exam  - RTO 1 year or as needed           ALAN Mir-SANDORM 04/11/25 2:18 PM

## 2025-05-20 ENCOUNTER — TELEPHONE (OUTPATIENT)
Facility: CLINIC | Age: 25
End: 2025-05-20
Payer: COMMERCIAL

## 2025-05-20 DIAGNOSIS — Z32.01 PREGNANCY TEST POSITIVE (HHS-HCC): ICD-10-CM

## 2025-05-21 ENCOUNTER — LAB REQUISITION (OUTPATIENT)
Dept: LAB | Facility: HOSPITAL | Age: 25
End: 2025-05-21
Payer: COMMERCIAL

## 2025-05-21 DIAGNOSIS — Z32.01 ENCOUNTER FOR PREGNANCY TEST, RESULT POSITIVE (HHS-HCC): ICD-10-CM

## 2025-05-21 LAB — B-HCG SERPL-ACNC: 476 MIU/ML

## 2025-05-21 PROCEDURE — 84702 CHORIONIC GONADOTROPIN TEST: CPT

## 2025-05-22 ENCOUNTER — TELEPHONE (OUTPATIENT)
Facility: CLINIC | Age: 25
End: 2025-05-22
Payer: COMMERCIAL

## 2025-05-22 NOTE — TELEPHONE ENCOUNTER
Informed pt of + quant and need to repeat in 48 hrs. Pt verbalized understanding and is in agreement with plan.    C Gene PCNA

## 2025-05-23 ENCOUNTER — LAB REQUISITION (OUTPATIENT)
Dept: LAB | Facility: HOSPITAL | Age: 25
End: 2025-05-23
Payer: COMMERCIAL

## 2025-05-23 ENCOUNTER — TELEPHONE (OUTPATIENT)
Facility: CLINIC | Age: 25
End: 2025-05-23
Payer: COMMERCIAL

## 2025-05-23 DIAGNOSIS — Z32.01 ENCOUNTER FOR PREGNANCY TEST, RESULT POSITIVE (HHS-HCC): ICD-10-CM

## 2025-05-23 LAB — B-HCG SERPL-ACNC: 1196 MIU/ML

## 2025-05-23 PROCEDURE — 84702 CHORIONIC GONADOTROPIN TEST: CPT

## 2025-06-02 ENCOUNTER — TELEPHONE (OUTPATIENT)
Facility: CLINIC | Age: 25
End: 2025-06-02
Payer: COMMERCIAL

## 2025-06-02 NOTE — TELEPHONE ENCOUNTER
Addressed with Julia, ok to monitor and keep apt next week with [facility]. Bleeding precautions given/when to call.    WILLIAN JACKSON

## 2025-06-02 NOTE — TELEPHONE ENCOUNTER
Yesterday morning, little bit of blood- super light then went away, happened again this morning. Denies slight cramping, and recent intercourse. Pt did help move furniture on Friday. , h/o one sab in October. LMP: 24. Has first apt on  with giancarlo.

## 2025-06-06 ENCOUNTER — OFFICE VISIT (OUTPATIENT)
Facility: CLINIC | Age: 25
End: 2025-06-06
Payer: COMMERCIAL

## 2025-06-06 VITALS — WEIGHT: 157 LBS | DIASTOLIC BLOOD PRESSURE: 88 MMHG | SYSTOLIC BLOOD PRESSURE: 118 MMHG | BODY MASS INDEX: 30.66 KG/M2

## 2025-06-06 DIAGNOSIS — O20.0 THREATENED ABORTION, ANTEPARTUM CONDITION OR COMPLICATION (HHS-HCC): Primary | ICD-10-CM

## 2025-06-06 DIAGNOSIS — Z86.32 HX GESTATIONAL DIABETES: ICD-10-CM

## 2025-06-06 DIAGNOSIS — Z34.81 MULTIGRAVIDA IN FIRST TRIMESTER (HHS-HCC): ICD-10-CM

## 2025-06-06 DIAGNOSIS — Z3A.01 7 WEEKS GESTATION OF PREGNANCY (HHS-HCC): ICD-10-CM

## 2025-06-06 DIAGNOSIS — N91.2 AMENORRHEA: ICD-10-CM

## 2025-06-06 DIAGNOSIS — Z87.59 HISTORY OF GESTATIONAL HYPERTENSION: ICD-10-CM

## 2025-06-06 DIAGNOSIS — Z36.89 ENCOUNTER FOR FETAL ANATOMIC SURVEY (HHS-HCC): ICD-10-CM

## 2025-06-06 PROBLEM — F41.8 MIXED ANXIETY DEPRESSIVE DISORDER: Status: ACTIVE | Noted: 2025-06-06

## 2025-06-06 LAB — CRL: 0.86 CM

## 2025-06-06 PROCEDURE — 1036F TOBACCO NON-USER: CPT | Performed by: ADVANCED PRACTICE MIDWIFE

## 2025-06-06 PROCEDURE — 99215 OFFICE O/P EST HI 40 MIN: CPT | Performed by: ADVANCED PRACTICE MIDWIFE

## 2025-06-06 ASSESSMENT — EDINBURGH POSTNATAL DEPRESSION SCALE (EPDS)
TOTAL SCORE: 0
THE THOUGHT OF HARMING MYSELF HAS OCCURRED TO ME: NEVER
I HAVE BEEN SO UNHAPPY THAT I HAVE HAD DIFFICULTY SLEEPING: NOT AT ALL
I HAVE LOOKED FORWARD WITH ENJOYMENT TO THINGS: AS MUCH AS I EVER DID
I HAVE BLAMED MYSELF UNNECESSARILY WHEN THINGS WENT WRONG: NO, NEVER
I HAVE BEEN ABLE TO LAUGH AND SEE THE FUNNY SIDE OF THINGS: AS MUCH AS I ALWAYS COULD
I HAVE BEEN ANXIOUS OR WORRIED FOR NO GOOD REASON: NO, NOT AT ALL
I HAVE FELT SCARED OR PANICKY FOR NO GOOD REASON: NO, NOT AT ALL
I HAVE FELT SAD OR MISERABLE: NO, NOT AT ALL
THINGS HAVE BEEN GETTING ON TOP OF ME: NO, I HAVE BEEN COPING AS WELL AS EVER
I HAVE BEEN SO UNHAPPY THAT I HAVE BEEN CRYING: NO, NEVER

## 2025-06-06 ASSESSMENT — LIFESTYLE VARIABLES
SKIP TO QUESTIONS 9-10: 1
HOW MANY STANDARD DRINKS CONTAINING ALCOHOL DO YOU HAVE ON A TYPICAL DAY: PATIENT DOES NOT DRINK
AUDIT-C TOTAL SCORE: 0
HOW OFTEN DO YOU HAVE A DRINK CONTAINING ALCOHOL: NEVER
HOW OFTEN DO YOU HAVE SIX OR MORE DRINKS ON ONE OCCASION: NEVER

## 2025-06-06 ASSESSMENT — ENCOUNTER SYMPTOMS
DEPRESSION: 0
FATIGUE: 0
VOMITING: 0
OCCASIONAL FEELINGS OF UNSTEADINESS: 0
DIARRHEA: 0
FEVER: 0
COUGH: 0
LOSS OF SENSATION IN FEET: 0
SHORTNESS OF BREATH: 0
NAUSEA: 0
DIZZINESS: 0
ABDOMINAL PAIN: 0
CONSTIPATION: 0
PALPITATIONS: 0
LIGHT-HEADEDNESS: 0

## 2025-06-06 ASSESSMENT — SOCIAL DETERMINANTS OF HEALTH (SDOH)
WITHIN THE LAST YEAR, HAVE YOU BEEN AFRAID OF YOUR PARTNER OR EX-PARTNER?: NO
WITHIN THE LAST YEAR, HAVE YOU BEEN KICKED, HIT, SLAPPED, OR OTHERWISE PHYSICALLY HURT BY YOUR PARTNER OR EX-PARTNER?: NO
WITHIN THE LAST YEAR, HAVE YOU BEEN HUMILIATED OR EMOTIONALLY ABUSED IN OTHER WAYS BY YOUR PARTNER OR EX-PARTNER?: NO
WITHIN THE LAST YEAR, HAVE TO BEEN RAPED OR FORCED TO HAVE ANY KIND OF SEXUAL ACTIVITY BY YOUR PARTNER OR EX-PARTNER?: NO

## 2025-06-06 ASSESSMENT — COLUMBIA-SUICIDE SEVERITY RATING SCALE - C-SSRS
2. HAVE YOU ACTUALLY HAD ANY THOUGHTS OF KILLING YOURSELF?: NO
6. HAVE YOU EVER DONE ANYTHING, STARTED TO DO ANYTHING, OR PREPARED TO DO ANYTHING TO END YOUR LIFE?: NO
1. IN THE PAST MONTH, HAVE YOU WISHED YOU WERE DEAD OR WISHED YOU COULD GO TO SLEEP AND NOT WAKE UP?: NO

## 2025-06-06 NOTE — PROGRESS NOTES
24 y/o presents to office for IOB visit.  @ 7.6 weeks by sure LMP.   Started spotting on  very light occasionally, none today.   Admits to nausea, no vomiting   ASA  Anatomy scan  RTO 4 weeks    Discussed--  1) Routine prenatal care  2) Genetic screening-- NIPT  3) Limited ultrasound done-- will obtain early anatomy  4) EPDS =0  5) Routine diet, exercise, and weight gain recommendations  6) Safe medication use     Oriented to practice, midwifery care, cross coverage call, and delivery at [facility]      Assessment   Diagnoses and all orders for this visit:  Threatened , antepartum condition or complication ([facility])  -     Type And Screen Is this order related to pregnancy or an upcoming surgery? No; Future  -     CBC Anemia Panel With Reflex,Pregnancy; Future  Amenorrhea  Multigravida in first trimester ([facility])  -     US OB transvaginal  -     C. trachomatis / N. gonorrhoeae, Amplified, Urogenital  -     Hemoglobin A1C; Future  -     Hepatitis B Surface Antigen; Future  -     Hepatitis C Antibody; Future  -     HIV 1/2 Antigen/Antibody Screen with Reflex to Confirmation; Future  -     Rubella Antibody, IgG; Future  -     Syphilis Screen with Reflex; Future  -     Urine Culture  -     Myriad Prequel Prenatal Screen; Future  Encounter for fetal anatomic survey ([facility])  -     US MAC OB imaging order; Future  7 weeks gestation of pregnancy ([facility])  History of gestational hypertension  -     Comprehensive Metabolic Panel; Future  -     Protein, Urine Random; Future  -     Creatinine, Urine Random; Future  Hx gestational diabetes      Plan   - New OB resources provided and reviewed with particular attention to dietary, travel, and medication restrictions  - Oriented to practice, CNM vs. MD care  - Reviewed bleeding precautions, warning signs, when to call provider; phone number provided  - Routine NOB labs ordered  - Return in 4 weeks for routine prenatal care    Katty Zamora,  "APRN-CNM    Subjective   Emperatriz C Cumberledge \"Leticia\" is a 25 y.o.  at Unknown with a working estimated date of delivery of Not found. who presents for an initial prenatal visit.     OB History    Para Term  AB Living   3 1 1 0 1 1   SAB IAB Ectopic Multiple Live Births   1 0 0 0 1      # Outcome Date GA Lbr Pedro/2nd Weight Sex Type Anes PTL Lv   3 Current            2 Term 05/10/23 38w0d  2.778 kg F Vag-Spont EPI  ANABELA      Complications: Gestational hypertension (HHS-HCC)   1 SAB              [place]  Depression Scale Total: 0  Prior pregnancy complications: diabetes      Medical History[1]   Surgical History[2]   Social History[3]     Objective   Physical Exam  Weight: 71.2 kg (157 lb)  TWG: -8.165 kg (-18 lb)   Expected Total Weight Gain: 5 kg (11 lb)-9 kg (19 lb)   Pregravid BMI: 34.18  BP: 118/88    Review of Systems   Constitutional:  Negative for fatigue and fever.   Respiratory:  Negative for cough and shortness of breath.    Cardiovascular:  Negative for chest pain and palpitations.   Gastrointestinal:  Negative for abdominal pain, constipation, diarrhea, nausea and vomiting.   Endocrine: Negative for cold intolerance and heat intolerance.   Genitourinary:  Negative for dyspareunia, pelvic pain, vaginal discharge and vaginal pain.   Neurological:  Negative for dizziness and light-headedness.        Physical Exam  Genitourinary:      Vulva normal.   Cardiovascular:      Rate and Rhythm: Normal rate.   Pulmonary:      Effort: Pulmonary effort is normal.   Neurological:      Mental Status: She is alert and oriented to person, place, and time.   Skin:     General: Skin is warm.   Psychiatric:         Mood and Affect: Mood normal.   Vitals reviewed.          Postpartum Depression: Low Risk  (2025)    [place]  Depression Scale     Last EPDS Total Score: 0     Last EPDS Self Harm Result: Never        Pregnancy Problems (from 24 to present)       No problems " associated with this episode.             Education provided:   Avoidance of alcohol, tobacco and drug use     Dietary restrictions reviewed including avoiding raw or poorly cooked meat, lunch meat and soft cheeses    3.    Adequate water intake.  Avoid empty calories with juices    4.    Recommendation for weight gain based on initial BMI (body mass index)    5.    Limit caffeine to less than 200-300 mg/day    6.    Take folic acid 400 mcg daily.  Incorporate 5,000u Vitamin D3 per day.  Magnesium supplementation with Ultra Mag           is great for headaches, muscle aches, constipation and sleep    7.    Importance of good sleep hygiene and avoidance of laying on back after 15 weeks    8.    Encourage daily physical activity of 30 minutes a day the majority of the days of the week.  There is a great program         for pregnant and postpartum mom's called [id number]    9.    Discussed normal physiologic changes:  Round ligament pain, nausea, breast tenderness    10.  Discussed natural remedies, vitamins and prescription medications for nausea    11.  Baby aspirin 162mg daily (two baby aspirin) for the reduction of pre-eclampsia during pregnancy.  Even if you have          never had any blood pressure issues, you can develop hypertension during your pregnancy.  This has been well          studied and safe to take starting at 10 weeks gestation until after the birth of your baby.    **IF AT ANYTIME DURING YOUR PREGNANCY YOU HAVE CONCERNS THAT YOU CANNOT AFFORD HEALTHY FOOD PLEASE LET US KNOW!**  We have a Food for Life program and would be happy to place a referral for you.  It is so important to eat healthy during your pregnancy and we treat food as medicine.  Healthy food is expensive!  This program will allow you and your family up to 4 to receive food and recipes for one week per month.  This needs to be renewed every 6 months.    Ultrasound and screening for aneuploidies (Down Syndrome/Trisomy 21, Trisomy 13 +  18)  There are two ultrasounds that are recommended during your pregnancy.  A nuchal translucency is done between 11-13 weeks and is checking for any structural defects that are obvious at this early gestation.  An anatomy scan will be done at approximately 19-20 weeks to look at all structures.  An order has been placed in [facility] to get these scheduled.    Based on risk factors and any concerns the maternal fetal medicine provider has, you may need a repeat ultrasound.  Healthy pregnancies that do not have any other concerns by the midwife or maternal fetal medicine do not have any repeat ultrasounds done.    Cell free DNA is a test that can be done at 10 weeks by a blood sample taken from you that can assess the baby to be low or high risk for trisomy 21 (Down Syndrome), and trisomy 13 + 18.  This test will also know the fetal sex only if you want to know.    Labs:   An order will be placed for your new ob labs.  Please get those done at the time of your ultrasound.  They will collect multiple tubes of blood for new ob labs and also urine for STI testing          and a urine culture.   If there are any concerns with any blood work or urine testing WE WILL CALL YOU OR COMMUNICATE VIA [employer]!!!   The biggest concerns our patients have is when they see          their complete blood count.  The reference range in the result is for a non pregnant person!  We will notify you if there is any need to start an iron supplement.  3.    At 26-28 weeks a glucose test is ordered to see if you have gestational diabetes.  We also reassess if you have anemia, which can be common in pregnancy  4.    Group B strep culture will be done at 36 weeks gestation.      How frequently will you come for your prenatal appointments?  We will see you in the office every 4 weeks until you are 30 weeks, then every 2 weeks until 36 weeks and then weekly until your baby is born.    Choices for care and hospital for birth:  I am a Certified Nurse  "Midwife and practice in a group setting, which means that any of the providers may be there for your birth.  We care for healthy females during pregnancy and labor/birth.  We practice in collaboration with physicians within our group.  If there are any concerns with your pregnancy, labor or birth our physicians work closely with us.      There are certain medical conditions that \"risks you out\" of midwifery care that we are constantly assessing for.  Some conditions during your pregnancy that would risk you out of midwifery care would be:   Severe growth restriction of your baby   Labor/Birth  less than 35 weeks   Severe pre-eclampsia at any time during your pregnancy/labor/birth   Gestational Diabetes needing medication (insulin) to control your blood sugars   If you decline or do not complete your glucose testing to rule out diet controlled diabetes by 32 weeks   If you are diagnosed with chronic hypertension during your pregnancy and need to start medication      To call for questions regarding your care of if you are in labor is [phone]    Danger signs to report:  Seek medical care immediately if you have pain that is doubling you over or vaginal bleeding that is heavier than a  period  Notify the office should you have any burning, urgency, frequency of urination or other concerning symptoms.    Medications that are safe for common discomforts of pregnancy:   Tylenol   Tums or Papaya extract for any upset stomach or heartburn   Zyrtec, Claritin, Benadryl for allergy symptoms   Sudafed or Robitussin for cold symptoms  [employer] is an lynne that is great for what medications are safe to take throughout your pregnancy and breastfeeding journey through the first year of life!  Well worth the $3.99    Work restrictions:  A normal healthy pregnancy without any complications are able to have the standard pregnancy work restrictions which is no pushing/pulling/lifting greater than 25 pounds " "independently    FMLA paperwork  Can be brought to the office for us to fill out for when you are starting your maternity leave (either your scheduled date of going to the hospital or your due date).  We cannot give out early FMLA when there is no documented medical conditions that are considered \"normal pregnancy\" events.    Comfort measures   Chiropractors that are [place] Certified are great for alleviation of ligament pain   Yoga is good for your ligaments and mentally preparing for baby and labor.  A prenatal yoga class is recommended.  3.     Prenatal massages are fine  4.     A maternity support belt is an amazing thing that can help ligament pain -- can be purchased on Amazon  5.     Good supportive shoes are key to helping with ligament pain    Dental care  It is very important to see a dentist during your pregnancy for routine cleaning and also if you develop any dental pain during your pregnancy.  Healthy gums and teeth are very important during your pregnancy.  We can provide you with a dental letter if your dentist would like one.    Thank you for choosing our [facility] and [facility] for you healthcare!  I am excited to partner with you during this very special time!     If there is anything I can do to help make your experience is positive, please come to your visits with questions and concerns and do not be afraid to ask what is on your mind!    Until then, be well!  [facility]    [facility]  [address]  [address]  [place]  [address]  [phone]    [facility]  [address]  [address]  [place] [address]  [phone]    [facility] - [address]  [address], [place]  [id number] [phone]    [facility] " ([place])  [address]  [place]  [address]  [phone]    [facility]  [address] [place]  [address] [phone]    [facility] [place] [address] [place] [address]  [address] [phone]    [facility]  [address]  [place]  [address] [phone]          [1]   Past Medical History:  Diagnosis Date    Acne vulgaris 10/25/2019    Acute pharyngitis, unspecified 03/05/2015    Sore throat    Adverse effect of other vaccines and biological substances, sequela 08/10/2018    Adverse effect of vaccine, sequela    Allergic contact dermatitis due to plants, except food 06/29/2016    Contact dermatitis due to poison ivy    Chronic constipation 11/28/2023    Chronic ethmoidal sinusitis 12/18/2017    Chronic posterior ethmoidal sinusitis    COVID-19 11/28/2023    GERD (gastroesophageal reflux disease) 11/28/2023    Gestational diabetes mellitus (GDM) in third trimester ([facility]) 11/28/2023    Gestational hypertension ([facility]) 11/28/2023    Glucose intolerance of pregnancy ([facility]) 11/28/2023    Insulin controlled gestational diabetes mellitus (GDM) during pregnancy, antepartum ([facility]) 11/28/2023    Intractable migraine without aura and without status migrainosus 11/28/2023    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    Other abnormal glucose 08/07/2018    Elevated glucose level    Perennial allergic rhinitis 11/28/2023    Personal history of diseases of the skin and subcutaneous tissue 12/06/2017    History of dermatitis    Personal history of other diseases of the digestive system 08/04/2017    History of constipation    Personal history of other diseases of the nervous system and sense organs 08/25/2015    History of episcleritis    Personal history of other diseases of the respiratory system 02/02/2015    History of acute sinusitis    Personal history of other diseases of the respiratory system 04/30/2015     History of acute bronchitis    Personal history of other specified conditions 08/09/2018    History of nausea    Personal history of other specified conditions 08/04/2017    History of abdominal pain    Personal history of other specified conditions 09/05/2017    History of right flank pain    Personal history of other specified conditions 08/16/2017    History of headache    Pre-existing essential htn comp pregnancy, first trimester ([facility]) 11/28/2023   [2] History reviewed. No pertinent surgical history.  [3]   Social History  Socioeconomic History    Marital status: Significant Other     Spouse name: Ramon    Number of children: 1   Tobacco Use    Smoking status: Never     Passive exposure: Never    Smokeless tobacco: Never   Vaping Use    Vaping status: Never Used   Substance and Sexual Activity    Alcohol use: Not Currently    Drug use: Never    Sexual activity: Yes     Partners: Male     Birth control/protection: None     Social Drivers of Health     Intimate Partner Violence: Not At Risk (6/6/2025)    Humiliation, Afraid, Rape, and Kick questionnaire     Fear of Current or Ex-Partner: No     Emotionally Abused: No     Physically Abused: No     Sexually Abused: No

## 2025-06-07 LAB
C TRACH RRNA SPEC QL NAA+PROBE: NOT DETECTED
N GONORRHOEA RRNA SPEC QL NAA+PROBE: NOT DETECTED
QUEST GC CT AMPLIFIED (ALWAYS MESSAGE): NORMAL

## 2025-06-08 LAB — BACTERIA UR CULT: ABNORMAL

## 2025-06-13 ENCOUNTER — APPOINTMENT (OUTPATIENT)
Facility: CLINIC | Age: 25
End: 2025-06-13
Payer: COMMERCIAL

## 2025-06-30 ENCOUNTER — LAB (OUTPATIENT)
Dept: LAB | Facility: HOSPITAL | Age: 25
End: 2025-06-30
Payer: COMMERCIAL

## 2025-06-30 DIAGNOSIS — Z34.81 ENCOUNTER FOR SUPERVISION OF OTHER NORMAL PREGNANCY, FIRST TRIMESTER: Primary | ICD-10-CM

## 2025-06-30 LAB
ABO GROUP (TYPE) IN BLOOD: NORMAL
ANTIBODY SCREEN: NORMAL
ERYTHROCYTE [DISTWIDTH] IN BLOOD BY AUTOMATED COUNT: 12.6 % (ref 11.5–14.5)
HCT VFR BLD AUTO: 37.5 % (ref 36–46)
HGB BLD-MCNC: 12.6 G/DL (ref 12–16)
MCH RBC QN AUTO: 28.1 PG (ref 26–34)
MCHC RBC AUTO-ENTMCNC: 33.6 G/DL (ref 32–36)
MCV RBC AUTO: 84 FL (ref 80–100)
NRBC BLD-RTO: 0 /100 WBCS (ref 0–0)
PLATELET # BLD AUTO: 303 X10*3/UL (ref 150–450)
RBC # BLD AUTO: 4.48 X10*6/UL (ref 4–5.2)
RH FACTOR (ANTIGEN D): NORMAL
WBC # BLD AUTO: 6.7 X10*3/UL (ref 4.4–11.3)

## 2025-06-30 PROCEDURE — 86901 BLOOD TYPING SEROLOGIC RH(D): CPT

## 2025-06-30 PROCEDURE — 86900 BLOOD TYPING SEROLOGIC ABO: CPT

## 2025-06-30 PROCEDURE — 85027 COMPLETE CBC AUTOMATED: CPT

## 2025-06-30 PROCEDURE — 86850 RBC ANTIBODY SCREEN: CPT

## 2025-06-30 PROCEDURE — 36415 COLL VENOUS BLD VENIPUNCTURE: CPT

## 2025-07-01 LAB
ALBUMIN SERPL-MCNC: 4.3 G/DL (ref 3.6–5.1)
ALP SERPL-CCNC: 54 U/L (ref 31–125)
ALT SERPL-CCNC: 13 U/L (ref 6–29)
ANION GAP SERPL CALCULATED.4IONS-SCNC: 8 MMOL/L (CALC) (ref 7–17)
AST SERPL-CCNC: 10 U/L (ref 10–30)
BILIRUB SERPL-MCNC: 0.4 MG/DL (ref 0.2–1.2)
BUN SERPL-MCNC: 8 MG/DL (ref 7–25)
CALCIUM SERPL-MCNC: 9.4 MG/DL (ref 8.6–10.2)
CHLORIDE SERPL-SCNC: 104 MMOL/L (ref 98–110)
CO2 SERPL-SCNC: 24 MMOL/L (ref 20–32)
CREAT SERPL-MCNC: 0.45 MG/DL (ref 0.5–0.96)
CREAT UR-MCNC: 209 MG/DL (ref 20–275)
EGFRCR SERPLBLD CKD-EPI 2021: 136 ML/MIN/1.73M2
GLUCOSE SERPL-MCNC: 93 MG/DL (ref 65–99)
POTASSIUM SERPL-SCNC: 4.5 MMOL/L (ref 3.5–5.3)
PROT SERPL-MCNC: 6.4 G/DL (ref 6.1–8.1)
REFLEX ADDED, ANEMIA PANEL: NORMAL
SODIUM SERPL-SCNC: 136 MMOL/L (ref 135–146)

## 2025-07-04 LAB
EST. AVERAGE GLUCOSE BLD GHB EST-MCNC: 108 MG/DL
EST. AVERAGE GLUCOSE BLD GHB EST-SCNC: 6 MMOL/L
HBA1C MFR BLD: 5.4 %
HBV SURFACE AG SERPL QL IA: NORMAL
HCV AB SERPL QL IA: NORMAL
HIV 1+2 AB+HIV1 P24 AG SERPL QL IA: NORMAL
HIV 1+2 AB+HIV1 P24 AG SERPL QL IA: NORMAL
RUBV IGG SERPL IA-ACNC: 1.36 INDEX
T PALLIDUM AB SER QL IA: NEGATIVE

## 2025-07-07 ENCOUNTER — APPOINTMENT (OUTPATIENT)
Facility: CLINIC | Age: 25
End: 2025-07-07
Payer: COMMERCIAL

## 2025-07-07 VITALS — SYSTOLIC BLOOD PRESSURE: 124 MMHG | DIASTOLIC BLOOD PRESSURE: 78 MMHG | BODY MASS INDEX: 30.27 KG/M2 | WEIGHT: 155 LBS

## 2025-07-07 DIAGNOSIS — Z67.91 RH NEGATIVE STATE IN ANTEPARTUM PERIOD (HHS-HCC): ICD-10-CM

## 2025-07-07 DIAGNOSIS — O26.899 RH NEGATIVE STATE IN ANTEPARTUM PERIOD (HHS-HCC): ICD-10-CM

## 2025-07-07 DIAGNOSIS — Z3A.11 11 WEEKS GESTATION OF PREGNANCY (HHS-HCC): ICD-10-CM

## 2025-07-07 DIAGNOSIS — Z34.81 MULTIGRAVIDA IN FIRST TRIMESTER (HHS-HCC): ICD-10-CM

## 2025-07-07 DIAGNOSIS — Z87.59 HISTORY OF GESTATIONAL HYPERTENSION: ICD-10-CM

## 2025-07-07 DIAGNOSIS — Z86.32 HX GESTATIONAL DIABETES: Primary | ICD-10-CM

## 2025-07-07 PROCEDURE — 0501F PRENATAL FLOW SHEET: CPT | Performed by: ADVANCED PRACTICE MIDWIFE

## 2025-07-07 NOTE — PROGRESS NOTES
"Assessment/Plan   Problem List Items Addressed This Visit       Post partum depression    Overview   Even if successfully weans off, will need to restart after delivery of next child.          Rh negative state in antepartum period ([facility])    11 weeks gestation of pregnancy ([facility])    Overview       Hx of GDMA2 and gHNT    First Trimester  [x] Pregnancy dated by: 6.6 week US on   [x] Blood Products: [x] Yes, accepts [] No, needs counseling  [x] Initial BMI: 34.18   [x] Prenatal Labs: reviewed, WNL  [x] GC/CT/urine culture: GBS pos in urine  [x] Rh status: neg  [x] Genetic Screening (cfDNA):      Second Trimester  [] Anatomy US: (19-20 wks):  [] 1hr GCT at 24-28wks:  [] 3 GTT hour (if indicated):  [] Rhogam (if indicated):       Vaccines  [] Tdap (27-32 weeks)  [] Flu   [] RSV    Third Trimester  [] Feeding Intentions:  [x] GBS at 36 - 37 wks: GBS positive in urine  [] 39 weeks discussion of IOL vs. Expectant management:  [] Mode of delivery ( anticipated ):  [] Consent  [] TOLAC consent (if indicated)            Multigravida in first trimester ([facility])    History of gestational hypertension    Overview   Patient with diagnosis of ghtn in first pregnancy   []  Baseline HELLP labs  []  Baseline UPCr   [x]   mg/day starting at 12 weeks GA          Hx gestational diabetes - Primary    Overview   Patient has documented history of GDM in 1st pregnancy  [x]  Early A1c               NT US scheduled order placed  Aneuploidy screening done pending    Follow up in 4 weeks for a routine prenatal visit.    Alina Chiu, APRN-WENDY    Subjective   Emperatriz Duvall \"Leticia\" is a 25 y.o.  at 11w2d with a working estimated date of delivery of 2026, by Ultrasound who presents for a routine prenatal visit. She denies vaginal bleeding or abdominal pain.      Education provided   Ligament pain does hurt!  It also tells us that the uterus is growing appropriately - remember your maternity          " support belt.  2.    Nausea and vomiting usually lessen by 12 weeks, completely goes away by 16 weeks  3.    Headaches are common and Tylenol is ok to take during pregnancy as long as you take less than 4 grams per day  4.    Make sure you are drinking plenty of water    The Midwifery Service at [facility] has a Certified Nurse Midwife on call  who is available to discuss any pregnancy related concerns or urgent needs that cannot wait until the next business day.  Please call the office where you are seen at during the day.  On call numbers for after hours are listed below.    At any time you would like to tour our facilities, please call [phone] to set up a tour    If you are  (less than 37) weeks and experience:     More than 5 contractions in an 1 hour period   If you have fluid leaking from your vagina   Bleeding that is as heavy as a period   Decreased fetal movements or changes in your baby's movement after 24 weeks   A headache that does not go away with Tylenol or rest    If you full term (37 weeks or greater) and experience:     Contractions that are 5 minutes a part for 2 hours that you cannot walk or talk through   A gush of fluid from your vagina   Bleeding that is as heavy as a period   Decrease fetal movements or changes in your baby's movements   A headache that does not go away with Tylenol or rest     During the weekday office hours please call the office you are normally seen at.    After hours please call:  For patients planning on birthing at [facility] or [facility]) and need to speak to the midwife on call:  [phone]    For patients planning on birthing at [facility] and need to speak to the the midwife on call:  [phone]      DO NOT USE [facility] MESSAGES - THEY ARE NOT MONITORED

## 2025-07-11 LAB
COMMENTS - MP RESULT TYPE: NORMAL
SCAN RESULT: NORMAL

## 2025-07-14 ENCOUNTER — APPOINTMENT (OUTPATIENT)
Dept: RADIOLOGY | Age: 25
End: 2025-07-14
Payer: COMMERCIAL

## 2025-07-14 DIAGNOSIS — Z34.81 MULTIGRAVIDA IN FIRST TRIMESTER (HHS-HCC): ICD-10-CM

## 2025-07-14 PROCEDURE — 76813 OB US NUCHAL MEAS 1 GEST: CPT | Performed by: STUDENT IN AN ORGANIZED HEALTH CARE EDUCATION/TRAINING PROGRAM

## 2025-07-14 PROCEDURE — 76813 OB US NUCHAL MEAS 1 GEST: CPT

## 2025-07-14 PROCEDURE — 76801 OB US < 14 WKS SINGLE FETUS: CPT

## 2025-07-16 ENCOUNTER — TELEPHONE (OUTPATIENT)
Dept: OBSTETRICS AND GYNECOLOGY | Facility: CLINIC | Age: 25
End: 2025-07-16
Payer: COMMERCIAL

## 2025-08-08 ENCOUNTER — APPOINTMENT (OUTPATIENT)
Facility: CLINIC | Age: 25
End: 2025-08-08
Payer: COMMERCIAL

## 2025-08-14 ENCOUNTER — APPOINTMENT (OUTPATIENT)
Dept: OBSTETRICS AND GYNECOLOGY | Facility: CLINIC | Age: 25
End: 2025-08-14
Payer: COMMERCIAL

## 2025-08-14 VITALS — BODY MASS INDEX: 30.47 KG/M2 | SYSTOLIC BLOOD PRESSURE: 120 MMHG | WEIGHT: 156 LBS | DIASTOLIC BLOOD PRESSURE: 76 MMHG

## 2025-08-14 DIAGNOSIS — O26.899 RH NEGATIVE STATE IN ANTEPARTUM PERIOD (HHS-HCC): ICD-10-CM

## 2025-08-14 DIAGNOSIS — Z67.91 RH NEGATIVE STATE IN ANTEPARTUM PERIOD (HHS-HCC): ICD-10-CM

## 2025-08-14 DIAGNOSIS — Z87.59 HISTORY OF GESTATIONAL HYPERTENSION: Primary | ICD-10-CM

## 2025-08-14 DIAGNOSIS — Z34.81 MULTIGRAVIDA IN FIRST TRIMESTER (HHS-HCC): ICD-10-CM

## 2025-08-14 DIAGNOSIS — O23.40 GROUP B STREPTOCOCCUS URINARY TRACT INFECTION AFFECTING PREGNANCY, ANTEPARTUM (HHS-HCC): ICD-10-CM

## 2025-08-14 DIAGNOSIS — Z3A.16 16 WEEKS GESTATION OF PREGNANCY (HHS-HCC): ICD-10-CM

## 2025-08-14 DIAGNOSIS — B95.1 GROUP B STREPTOCOCCUS URINARY TRACT INFECTION AFFECTING PREGNANCY, ANTEPARTUM (HHS-HCC): ICD-10-CM

## 2025-08-14 PROBLEM — Z86.59 HISTORY OF POSTPARTUM DEPRESSION: Status: ACTIVE | Noted: 2024-01-30

## 2025-08-14 PROBLEM — R73.03 PRE-DIABETES: Status: RESOLVED | Noted: 2023-11-28 | Resolved: 2025-08-14

## 2025-08-14 PROBLEM — E78.00 HYPERCHOLESTEROLEMIA: Status: RESOLVED | Noted: 2023-11-28 | Resolved: 2025-08-14

## 2025-08-14 PROBLEM — M54.31 SCIATICA OF RIGHT SIDE: Status: RESOLVED | Noted: 2025-03-14 | Resolved: 2025-08-14

## 2025-08-14 PROBLEM — Z00.00 WELL ADULT EXAM: Status: RESOLVED | Noted: 2024-01-30 | Resolved: 2025-08-14

## 2025-08-14 LAB
POC BLOOD, URINE: NEGATIVE
POC GLUCOSE, URINE: NEGATIVE MG/DL
POC KETONES, URINE: NEGATIVE MG/DL
POC LEUKOCYTES, URINE: NEGATIVE
POC NITRITE,URINE: NEGATIVE
POC PROTEIN, URINE: NEGATIVE MG/DL

## 2025-08-14 PROCEDURE — 1036F TOBACCO NON-USER: CPT | Performed by: OBSTETRICS & GYNECOLOGY

## 2025-08-14 PROCEDURE — 0501F PRENATAL FLOW SHEET: CPT | Performed by: OBSTETRICS & GYNECOLOGY

## 2025-08-14 RX ORDER — AMOXICILLIN 500 MG/1
500 TABLET, FILM COATED ORAL 2 TIMES DAILY
Qty: 14 TABLET | Refills: 0 | Status: SHIPPED | OUTPATIENT
Start: 2025-08-14 | End: 2025-08-21

## 2025-08-14 RX ORDER — FLUOXETINE HYDROCHLORIDE 40 MG/1
40 CAPSULE ORAL DAILY
COMMUNITY
End: 2025-08-15 | Stop reason: SDUPTHER

## 2025-08-14 RX ORDER — ASPIRIN 81 MG/1
81 TABLET ORAL DAILY
COMMUNITY

## 2025-08-14 RX ORDER — FENOFIBRIC ACID 45 MG/1
45 CAPSULE, DELAYED RELEASE ORAL DAILY
COMMUNITY

## 2025-08-15 DIAGNOSIS — F41.9 ANXIETY: Primary | ICD-10-CM

## 2025-08-15 RX ORDER — FLUOXETINE HYDROCHLORIDE 40 MG/1
40 CAPSULE ORAL DAILY
Qty: 90 CAPSULE | Refills: 2 | Status: SHIPPED | OUTPATIENT
Start: 2025-08-15

## 2025-08-18 LAB
CREAT UR-MCNC: 39 MG/DL (ref 20–275)
PROT UR-MCNC: 6 MG/DL (ref 5–24)
PROT/CREAT UR: 0.15 MG/MG CREAT (ref 0.02–0.18)
PROT/CREAT UR: 154 MG/G CREAT (ref 24–184)

## 2025-09-05 ENCOUNTER — ANCILLARY PROCEDURE (OUTPATIENT)
Dept: RADIOLOGY | Age: 25
End: 2025-09-05
Payer: COMMERCIAL

## 2025-09-05 DIAGNOSIS — Z34.81 MULTIGRAVIDA IN FIRST TRIMESTER (HHS-HCC): ICD-10-CM

## 2025-09-05 DIAGNOSIS — Z03.73 ENCOUNTER FOR SUSPECTED FETAL ANOMALY RULED OUT: ICD-10-CM

## 2025-09-05 DIAGNOSIS — O99.212 OBESITY AFFECTING PREGNANCY IN SECOND TRIMESTER (HHS-HCC): ICD-10-CM

## 2025-09-05 PROCEDURE — 76811 OB US DETAILED SNGL FETUS: CPT

## 2025-09-09 ENCOUNTER — APPOINTMENT (OUTPATIENT)
Dept: OBSTETRICS AND GYNECOLOGY | Facility: CLINIC | Age: 25
End: 2025-09-09
Payer: COMMERCIAL